# Patient Record
Sex: MALE | Race: WHITE | NOT HISPANIC OR LATINO | Employment: FULL TIME | URBAN - METROPOLITAN AREA
[De-identification: names, ages, dates, MRNs, and addresses within clinical notes are randomized per-mention and may not be internally consistent; named-entity substitution may affect disease eponyms.]

---

## 2017-02-20 ENCOUNTER — OFFICE VISIT (OUTPATIENT)
Dept: FAMILY MEDICINE | Facility: CLINIC | Age: 22
End: 2017-02-20
Payer: COMMERCIAL

## 2017-02-20 VITALS
RESPIRATION RATE: 16 BRPM | HEIGHT: 70 IN | SYSTOLIC BLOOD PRESSURE: 129 MMHG | WEIGHT: 200 LBS | DIASTOLIC BLOOD PRESSURE: 75 MMHG | TEMPERATURE: 98.5 F | HEART RATE: 56 BPM | BODY MASS INDEX: 28.63 KG/M2

## 2017-02-20 DIAGNOSIS — L02.92 FURUNCLE OF SKIN OR SUBCUTANEOUS TISSUE: Primary | ICD-10-CM

## 2017-02-20 PROCEDURE — 99213 OFFICE O/P EST LOW 20 MIN: CPT | Performed by: PHYSICIAN ASSISTANT

## 2017-02-20 NOTE — MR AVS SNAPSHOT
"              After Visit Summary   2017    Chato Victor    MRN: [id number]           Patient Information     Date Of Birth          1995        Visit Information        Provider Department      2017 11:00 AM Curry Sampson PA-C [facility]        Today's Diagnoses     Furuncle of skin or subcutaneous tissue    -  1       Follow-ups after your visit        Who to contact     If you have questions or need follow up information about today's clinic visit or your schedule please contact [facility] directly at [phone].  Normal or non-critical lab and imaging results will be communicated to you by Fashionchickhart, letter or phone within 4 business days after the clinic has received the results. If you do not hear from us within 7 days, please contact the clinic through Fashionchickhart or phone. If you have a critical or abnormal lab result, we will notify you by phone as soon as possible.  Submit refill requests through wildcraft or call your pharmacy and they will forward the refill request to us. Please allow 3 business days for your refill to be completed.          Additional Information About Your Visit        MyChart Information     wildcraft lets you send messages to your doctor, view your test results, renew your prescriptions, schedule appointments and more. To sign up, go to [email or web address] . Click on \"Log in\" on the left side of the screen, which will take you to the Welcome page. Then click on \"Sign up Now\" on the right side of the page.     You will be asked to enter the access code listed below, as well as some personal information. Please follow the directions to create your username and password.     Your access code is: [email or web address]  Expires: 3/29/2017  8:45 AM     Your access code will  in 90 days. If you need help or a new code, please call your [facility] or [phone].        Care EveryWhere ID     This is your Care EveryWhere ID. This " "could be used by other organizations to access your [place] medical records  [id number]        Your Vitals Were     Pulse Temperature Respirations Height BMI (Body Mass Index)       56 98.5  F (36.9  C) (Oral) 16 5' 10\" (1.778 m) 28.7 kg/m2        Blood Pressure from Last 3 Encounters:   02/20/17 129/75   12/29/16 128/80   09/23/16 121/81    Weight from Last 3 Encounters:   02/20/17 200 lb (90.7 kg)   12/29/16 202 lb 6.4 oz (91.8 kg)   09/23/16 192 lb (87.1 kg)              Today, you had the following     No orders found for display       Primary Care Provider Office Phone # Fax #    Curry Raul Sampson PA-C [phone]       [address] [address]        Thank you!     Thank you for choosing [facility]  for your care. Our goal is always to provide you with excellent care. Hearing back from our patients is one way we can continue to improve our services. Please take a few minutes to complete the written survey that you may receive in the mail after your visit with us. Thank you!             Your Updated Medication List - Protect others around you: Learn how to safely use, store and throw away your medicines at www.disposemymeds.org.      Notice  As of 2/20/2017 11:27 AM    You have not been prescribed any medications.      "

## 2017-02-20 NOTE — PROGRESS NOTES
"  SUBJECTIVE:                                                    Chato Victor is a 21 year old male who presents to clinic today for the following health issues:        Concern - lump     Onset: couple weeks    Description:   Lump on scrotum-left side    Intensity: mild to moderate    Progression of Symptoms: same-size will change at times-get larger and then smaller. Small currently    Accompanying Signs & Symptoms:  Pain started couple days ago. Occurs with palpation        Previous history of similar problem:   has had similar sx in the past-went away on its own    Precipitating factors:   Worsened by: worse to the touch    Alleviating factors:  Improved by:        Therapies Tried and outcome:     Patient is currently not sexually active. No concerns for STIs.     Problem list and histories reviewed & adjusted, as indicated.  Additional history: as documented    Problem list, Medication list, Allergies, and Medical/Social/Surgical histories reviewed in EPIC and updated as appropriate.    ROS:  Constitutional, cardiovascular, pulmonary, gi and gu systems are negative, except as otherwise noted.    OBJECTIVE:                                                    /75 (BP Location: Right arm, Patient Position: Chair, Cuff Size: Adult Regular)  Pulse 56  Temp 98.5  F (36.9  C) (Oral)  Resp 16  Ht 5' 10\" (1.778 m)  Wt 200 lb (90.7 kg)  BMI 28.7 kg/m2  Body mass index is 28.7 kg/(m^2).  GENERAL: healthy, alert and no distress   (male): healing furuncle noted at left base of penis. Otherwise, normal male genitalia without lesions or urethral discharge, no hernia  PSYCH: mentation appears normal, affect normal/bright    Diagnostic Test Results:  none      ASSESSMENT/PLAN:                                                      (L02.92) Furuncle of skin or subcutaneous tissue  (primary encounter diagnosis)  Comment: evident on exam. Does not appear as chancre or chancoid. Also does not appear as wart, but this is also " possible. Given change in size and occurrence, felt likely secondary to pubic grooming and irritation. Recommending warm compresses and exfoliation of area. Literature given on diagnosis. Otherwise, reassured. Follow up prn.   Plan:     Follow up: as above     Curry Sampson PA-C  [facility]

## 2017-02-20 NOTE — NURSING NOTE
"Chief Complaint   Patient presents with     Mass     Initial /75 (BP Location: Right arm, Patient Position: Chair, Cuff Size: Adult Regular)  Pulse 56  Temp 98.5  F (36.9  C) (Oral)  Resp 16  Ht 5' 10\" (1.778 m)  Wt 200 lb (90.7 kg)  BMI 28.7 kg/m2 Estimated body mass index is 28.7 kg/(m^2) as calculated from the following:    Height as of this encounter: 5' 10\" (1.778 m).    Weight as of this encounter: 200 lb (90.7 kg)..  BP completed using cuff size regular-RA    "

## 2017-02-28 ENCOUNTER — OFFICE VISIT (OUTPATIENT)
Dept: FAMILY MEDICINE | Facility: CLINIC | Age: 22
End: 2017-02-28
Payer: COMMERCIAL

## 2017-02-28 VITALS
HEART RATE: 55 BPM | BODY MASS INDEX: 29.07 KG/M2 | OXYGEN SATURATION: 97 % | SYSTOLIC BLOOD PRESSURE: 134 MMHG | DIASTOLIC BLOOD PRESSURE: 70 MMHG | TEMPERATURE: 98.3 F | WEIGHT: 202.6 LBS

## 2017-02-28 DIAGNOSIS — F33.0 MAJOR DEPRESSIVE DISORDER, RECURRENT EPISODE, MILD (H): ICD-10-CM

## 2017-02-28 DIAGNOSIS — F41.9 ANXIETY: Primary | ICD-10-CM

## 2017-02-28 PROCEDURE — 99214 OFFICE O/P EST MOD 30 MIN: CPT | Performed by: PHYSICIAN ASSISTANT

## 2017-02-28 RX ORDER — DESVENLAFAXINE 50 MG/1
50 TABLET, FILM COATED, EXTENDED RELEASE ORAL DAILY
Qty: 90 TABLET | Refills: 3 | Status: SHIPPED | OUTPATIENT
Start: 2017-02-28 | End: 2017-06-05

## 2017-02-28 ASSESSMENT — ANXIETY QUESTIONNAIRES
IF YOU CHECKED OFF ANY PROBLEMS ON THIS QUESTIONNAIRE, HOW DIFFICULT HAVE THESE PROBLEMS MADE IT FOR YOU TO DO YOUR WORK, TAKE CARE OF THINGS AT HOME, OR GET ALONG WITH OTHER PEOPLE: SOMEWHAT DIFFICULT
2. NOT BEING ABLE TO STOP OR CONTROL WORRYING: MORE THAN HALF THE DAYS
GAD7 TOTAL SCORE: 7
1. FEELING NERVOUS, ANXIOUS, OR ON EDGE: MORE THAN HALF THE DAYS
7. FEELING AFRAID AS IF SOMETHING AWFUL MIGHT HAPPEN: NOT AT ALL
5. BEING SO RESTLESS THAT IT IS HARD TO SIT STILL: SEVERAL DAYS
3. WORRYING TOO MUCH ABOUT DIFFERENT THINGS: SEVERAL DAYS
6. BECOMING EASILY ANNOYED OR IRRITABLE: NOT AT ALL

## 2017-02-28 ASSESSMENT — PATIENT HEALTH QUESTIONNAIRE - PHQ9: 5. POOR APPETITE OR OVEREATING: SEVERAL DAYS

## 2017-02-28 NOTE — MR AVS SNAPSHOT
"              After Visit Summary   2017    Chato Victor    MRN: [id number]           Patient Information     Date Of Birth          1995        Visit Information        Provider Department      2017 1:00 PM Curry Sampson PA-C [facility]        Today's Diagnoses     Anxiety    -  1    Major depressive disorder, recurrent episode, mild (H)           Follow-ups after your visit        Who to contact     If you have questions or need follow up information about today's clinic visit or your schedule please contact [facility] directly at [phone].  Normal or non-critical lab and imaging results will be communicated to you by Aldagenhart, letter or phone within 4 business days after the clinic has received the results. If you do not hear from us within 7 days, please contact the clinic through Aldagenhart or phone. If you have a critical or abnormal lab result, we will notify you by phone as soon as possible.  Submit refill requests through Back9 Network or call your pharmacy and they will forward the refill request to us. Please allow 3 business days for your refill to be completed.          Additional Information About Your Visit        MyChart Information     Back9 Network lets you send messages to your doctor, view your test results, renew your prescriptions, schedule appointments and more. To sign up, go to [email or web address] . Click on \"Log in\" on the left side of the screen, which will take you to the Welcome page. Then click on \"Sign up Now\" on the right side of the page.     You will be asked to enter the access code listed below, as well as some personal information. Please follow the directions to create your username and password.     Your access code is: [email or web address]  Expires: 3/29/2017  8:45 AM     Your access code will  in 90 days. If you need help or a new code, please call your [facility] or [phone].        Care EveryWhere ID     This is " your Care EveryWhere ID. This could be used by other organizations to access your [place] medical records  [id number]        Your Vitals Were     Pulse Temperature Pulse Oximetry BMI (Body Mass Index)          55 98.3  F (36.8  C) (Oral) 97% 29.07 kg/m2         Blood Pressure from Last 3 Encounters:   02/28/17 134/70   02/20/17 129/75   12/29/16 128/80    Weight from Last 3 Encounters:   02/28/17 202 lb 9.6 oz (91.9 kg)   02/20/17 200 lb (90.7 kg)   12/29/16 202 lb 6.4 oz (91.8 kg)              Today, you had the following     No orders found for display         Today's Medication Changes          These changes are accurate as of: 2/28/17  1:28 PM.  If you have any questions, ask your nurse or doctor.               Start taking these medicines.        Dose/Directions    desvenlafaxine succinate ER 50 MG 24 hr tablet   Commonly known as:  PRISTIQ   Used for:  Major depressive disorder, recurrent episode, mild (H), Anxiety   Started by:  Curry Sampson PA-C        Dose:  50 mg   Take 1 tablet (50 mg) by mouth daily   Quantity:  90 tablet   Refills:  3            Where to get your medicines      These medications were sent to [place] [address] [place] [facility] [id number] [place] [address]     Phone:  [phone]     desvenlafaxine succinate ER 50 MG 24 hr tablet                Primary Care Provider Office Phone # Fax #    Curry Sampson PA-C [phone]       [facility] [id number] [place] [address]        Thank you!     Thank you for choosing [facility]  for your care. Our goal is always to provide you with excellent care. Hearing back from our patients is one way we can continue to improve our services. Please take a few minutes to complete the written survey that you may receive in the mail after your visit with us. Thank you!             Your Updated Medication List - Protect others around you: Learn  how to safely use, store and throw away your medicines at www.disposemymeds.org.          This list is accurate as of: 2/28/17  1:28 PM.  Always use your most recent med list.                   Brand Name Dispense Instructions for use    desvenlafaxine succinate ER 50 MG 24 hr tablet    PRISTIQ    90 tablet    Take 1 tablet (50 mg) by mouth daily

## 2017-02-28 NOTE — PROGRESS NOTES
SUBJECTIVE:                                                    Chato Victor is a 21 year old male who presents to clinic today for the following health issues:        Depression and Anxiety Follow-Up    Status since last visit: Worsened-depression feels like this is well controlled but has been stressed out more lately. Has been studying for mcat, obtaining EMT, and adjusting between school and graduation     Other associated symptoms:None    Complicating factors:     Significant life event: Yes-  As above     Current substance abuse: None    PHQ-9 SCORE 12/29/2016   Total Score 3     INDIGO-7 SCORE 12/29/2016   Total Score 3        PHQ-9  English      PHQ-9   Any Language     GAD7     Has been on effexor resulting in nausea and headache side effects. wellbutrin has been in the past and depression continued.     Problem list and histories reviewed & adjusted, as indicated.  Additional history: as documented    Patient Active Problem List   Diagnosis     Major depressive disorder, recurrent episode, mild (H)     Anxiety     History reviewed. No pertinent past surgical history.    Social History   Substance Use Topics     Smoking status: Current Some Day Smoker     Smokeless tobacco: Never Used      Comment: socially     Alcohol use 0.0 oz/week     0 Standard drinks or equivalent per week      Comment: socially      Family History   Problem Relation Age of Onset     Coronary Artery Disease No family hx of      DIABETES No family hx of          Current Outpatient Prescriptions   Medication Sig Dispense Refill     desvenlafaxine succinate ER (PRISTIQ) 50 MG 24 hr tablet Take 1 tablet (50 mg) by mouth daily 90 tablet 3     No Known Allergies  BP Readings from Last 3 Encounters:   02/28/17 134/70   02/20/17 129/75   12/29/16 128/80    Wt Readings from Last 3 Encounters:   02/28/17 202 lb 9.6 oz (91.9 kg)   02/20/17 200 lb (90.7 kg)   12/29/16 202 lb 6.4 oz (91.8 kg)                    Reviewed and updated as needed this  visit by clinical staff  Tobacco  Allergies  Meds  Med Hx  Surg Hx  Fam Hx  Soc Hx        ROS:  Constitutional, psych, cardiovascular, pulmonary  systems are negative, except as otherwise noted.    OBJECTIVE:                                                    /70 (BP Location: Right arm, Patient Position: Chair, Cuff Size: Adult Large)  Pulse 55  Temp 98.3  F (36.8  C) (Oral)  Wt 202 lb 9.6 oz (91.9 kg)  SpO2 97%  BMI 29.07 kg/m2  Body mass index is 29.07 kg/(m^2).  GENERAL: healthy, alert and no distress  RESP: lungs clear to auscultation - no rales, rhonchi or wheezes  CV: regular rates and rhythm, normal S1 S2, no S3 or S4 and no murmur, click or rub  PSYCH: mentation appears normal, affect normal/bright    Diagnostic Test Results:  none      ASSESSMENT/PLAN:                                                      (F41.9) Anxiety  (primary encounter diagnosis)  Comment: evident on history. Discussed with patient both anxiety and depression are common to occur together. pristiq will likely aid in symptoms as well. Is seeing therapy once weekly. Started this last week. Recommending continued cbt and follow up in 3 months. Sooner if concerns.   Plan: desvenlafaxine succinate ER (PRISTIQ) 50 MG 24         hr tablet        -Medication use and side effects discussed with the patient. Patient is in complete understanding and agreement with plan.       (F33.0) Major depressive disorder, recurrent episode, mild (H)  Comment:   Plan: desvenlafaxine succinate ER (PRISTIQ) 50 MG 24         hr tablet              Follow up: 3 months     Curry Sampson PA-C  [facility]

## 2017-02-28 NOTE — NURSING NOTE
"Chief Complaint   Patient presents with     Medication Follow-up       Initial /70 (BP Location: Right arm, Patient Position: Chair, Cuff Size: Adult Large)  Pulse 55  Temp 98.3  F (36.8  C) (Oral)  Wt 202 lb 9.6 oz (91.9 kg)  SpO2 97%  BMI 29.07 kg/m2 Estimated body mass index is 29.07 kg/(m^2) as calculated from the following:    Height as of 2/20/17: 5' 10\" (1.778 m).    Weight as of this encounter: 202 lb 9.6 oz (91.9 kg).  BP completed using cuff size: melody Condon MA    "

## 2017-03-01 ASSESSMENT — PATIENT HEALTH QUESTIONNAIRE - PHQ9: SUM OF ALL RESPONSES TO PHQ QUESTIONS 1-9: 4

## 2017-03-01 ASSESSMENT — ANXIETY QUESTIONNAIRES: GAD7 TOTAL SCORE: 7

## 2017-06-02 ENCOUNTER — TELEPHONE (OUTPATIENT)
Dept: FAMILY MEDICINE | Facility: CLINIC | Age: 22
End: 2017-06-02

## 2017-06-02 DIAGNOSIS — F41.9 ANXIETY: ICD-10-CM

## 2017-06-02 DIAGNOSIS — F33.0 MAJOR DEPRESSIVE DISORDER, RECURRENT EPISODE, MILD (H): ICD-10-CM

## 2017-06-02 NOTE — TELEPHONE ENCOUNTER
Fax from [facility]   PA needed Wendi Avendaño we will need to call [facility] for insurance info not on fax.   Before we do, what is rationale for PA?   Garcia Valencia RN

## 2017-06-05 RX ORDER — DESVENLAFAXINE 50 MG/1
50 TABLET, FILM COATED, EXTENDED RELEASE ORAL DAILY
Qty: 90 TABLET | Refills: 2 | Status: SHIPPED | OUTPATIENT
Start: 2017-06-05 | End: 2018-05-07

## 2017-06-05 NOTE — TELEPHONE ENCOUNTER
We called [facility]  [facility] [phone]  ID [id number]  Pt new to this pan per rep 1/1/17. We talked with [facility] rep: Heidi PALOMARES approved 36 mos  Effective date: 6/5/17 - 6/5/2020  PA reference # [id number]  Left message [facility] voicemail  Garcia Valencia RN

## 2017-06-05 NOTE — TELEPHONE ENCOUNTER
Pt calls, discussed below, [facility] wanting to fill Brand name and more expensive, called our pharmacy, product now available in past 30 days, resent for generic, recommend pt f/u with [facility], call or have pharmacy fax info if further troubles    Lizbeth Byrne RN, BSN  Message handled by Nurse Triage.

## 2017-06-23 ENCOUNTER — OFFICE VISIT (OUTPATIENT)
Dept: FAMILY MEDICINE | Facility: CLINIC | Age: 22
End: 2017-06-23
Payer: COMMERCIAL

## 2017-06-23 VITALS
BODY MASS INDEX: 28.42 KG/M2 | TEMPERATURE: 97.7 F | DIASTOLIC BLOOD PRESSURE: 80 MMHG | HEART RATE: 61 BPM | HEIGHT: 71 IN | RESPIRATION RATE: 16 BRPM | SYSTOLIC BLOOD PRESSURE: 128 MMHG | WEIGHT: 203 LBS

## 2017-06-23 DIAGNOSIS — N50.89 PAIN OF MALE GENITALIA: ICD-10-CM

## 2017-06-23 DIAGNOSIS — R30.0 DYSURIA: Primary | ICD-10-CM

## 2017-06-23 LAB
ALBUMIN UR-MCNC: NEGATIVE MG/DL
APPEARANCE UR: CLEAR
BILIRUB UR QL STRIP: NEGATIVE
COLOR UR AUTO: YELLOW
GLUCOSE UR STRIP-MCNC: NEGATIVE MG/DL
HGB UR QL STRIP: NEGATIVE
KETONES UR STRIP-MCNC: NEGATIVE MG/DL
LEUKOCYTE ESTERASE UR QL STRIP: NEGATIVE
NITRATE UR QL: NEGATIVE
PH UR STRIP: 7 PH (ref 5–7)
SP GR UR STRIP: 1.01 (ref 1–1.03)
URN SPEC COLLECT METH UR: NORMAL
UROBILINOGEN UR STRIP-ACNC: 0.2 EU/DL (ref 0.2–1)

## 2017-06-23 PROCEDURE — 87591 N.GONORRHOEAE DNA AMP PROB: CPT | Performed by: FAMILY MEDICINE

## 2017-06-23 PROCEDURE — 81003 URINALYSIS AUTO W/O SCOPE: CPT | Performed by: FAMILY MEDICINE

## 2017-06-23 PROCEDURE — 87491 CHLMYD TRACH DNA AMP PROBE: CPT | Performed by: FAMILY MEDICINE

## 2017-06-23 PROCEDURE — 96372 THER/PROPH/DIAG INJ SC/IM: CPT | Performed by: FAMILY MEDICINE

## 2017-06-23 PROCEDURE — 99213 OFFICE O/P EST LOW 20 MIN: CPT | Mod: 25 | Performed by: FAMILY MEDICINE

## 2017-06-23 RX ORDER — EPINEPHRINE 0.3 MG/.3ML
INJECTION SUBCUTANEOUS
Refills: 11 | COMMUNITY
Start: 2017-06-12

## 2017-06-23 RX ORDER — DOXYCYCLINE 100 MG/1
100 CAPSULE ORAL 2 TIMES DAILY
Qty: 14 CAPSULE | Refills: 0 | Status: SHIPPED | OUTPATIENT
Start: 2017-06-23 | End: 2017-06-30

## 2017-06-23 RX ORDER — CEFTRIAXONE SODIUM 250 MG/1
250 INJECTION, POWDER, FOR SOLUTION INTRAMUSCULAR; INTRAVENOUS ONCE
Qty: 1 ML | Refills: 0 | OUTPATIENT
Start: 2017-06-23 | End: 2017-06-23

## 2017-06-23 RX ORDER — ALBUTEROL SULFATE 90 UG/1
2 AEROSOL, METERED RESPIRATORY (INHALATION) EVERY 6 HOURS PRN
Qty: 1 INHALER | Refills: 1 | COMMUNITY
Start: 2017-06-23 | End: 2021-12-21

## 2017-06-23 NOTE — NURSING NOTE
"Chief Complaint   Patient presents with     Groin Pain     groin pain x1 1/2 weeks       Initial /80 (BP Location: Right arm, Patient Position: Chair, Cuff Size: Adult Large)  Pulse 61  Temp 97.7  F (36.5  C) (Oral)  Resp 16  Ht 5' 10.5\" (1.791 m)  Wt 203 lb (92.1 kg)  BMI 28.72 kg/m2 Estimated body mass index is 28.72 kg/(m^2) as calculated from the following:    Height as of this encounter: 5' 10.5\" (1.791 m).    Weight as of this encounter: 203 lb (92.1 kg).  Medication Reconciliation: complete     Trini Colon/ROGELIO  [place]      "

## 2017-06-23 NOTE — PROGRESS NOTES
SUBJECTIVE:                                                    Chato Victor is a 21 year old male who presents to clinic today for the following health issues:      Genitourinary - Male  Onset: x 1 1/2 weeks    Description:   Dysuria (painful urination): YES  Hematuria (blood in urine): no   Frequency: no   Are you urinating at night : no   Hesitancy (delay in urine): no   Retention (unable to empty): YES  Decrease in urinary flow: no   Incontinence: no     Progression of Symptoms:  worsening    Accompanying Signs & Symptoms:  Fever: no   Back/Flank pain: no   Urethral discharge: no   Testicle lumps/masses/pain: no   Nausea and/or vomiting: no   Abdominal pain: no     History:   History of frequent UTI's: no   History of kidney stones: no   History of hernias: no   Personal or Family history of Prostate problems: no  Sexually active: YES    Precipitating factors:       Alleviating factors:    Does masturbate at least 2-3 times a week. Is sexually active and did have unprotected sex recently.       Problem list and histories reviewed & adjusted, as indicated.  Additional history: as documented    Patient Active Problem List   Diagnosis     Major depressive disorder, recurrent episode, mild (H)     Anxiety     No past surgical history on file.    Social History   Substance Use Topics     Smoking status: Current Some Day Smoker     Smokeless tobacco: Never Used      Comment: socially     Alcohol use 0.0 oz/week     0 Standard drinks or equivalent per week      Comment: socially      Family History   Problem Relation Age of Onset     Coronary Artery Disease No family hx of      DIABETES No family hx of            Reviewed and updated as needed this visit by clinical staff  Tobacco  Allergies       Reviewed and updated as needed this visit by Provider         ROS:  Constitutional,  gu systems are negative, except as otherwise noted.    OBJECTIVE:     /80 (BP Location: Right arm, Patient Position: Chair, Cuff  "Size: Adult Large)  Pulse 61  Temp 97.7  F (36.5  C) (Oral)  Resp 16  Ht 5' 10.5\" (1.791 m)  Wt 203 lb (92.1 kg)  BMI 28.72 kg/m2  Body mass index is 28.72 kg/(m^2).  GENERAL: healthy, alert and no distress  RESP: lungs clear to auscultation - no rales, rhonchi or wheezes  CV: regular rate and rhythm, normal S1 S2, no S3 or S4, no murmur, click or rub, no peripheral edema and peripheral pulses strong   (male): testicles normal without atrophy or masses, scant clear penile discharge noted otherwise unremarkable     Diagnostic Test Results:  UA RESULTS:  Recent Labs   Lab Test  06/23/17   1507   COLOR  Yellow   APPEARANCE  Clear   URINEGLC  Negative   URINEBILI  Negative   URINEKETONE  Negative   SG  1.010   UBLD  Negative   URINEPH  7.0   PROTEIN  Negative   UROBILINOGEN  0.2   NITRITE  Negative   LEUKEST  Negative         ASSESSMENT/PLAN:       1. Dysuria  - will treat presumptively for epididymitis secondary to chlamydia/gonorrhea.   - UA reflex to Microscopic and Culture  - Chlamydia trachomatis PCR  - Neisseria gonorrhoeae PCR  - doxycycline (VIBRAMYCIN) 100 MG capsule; Take 1 capsule (100 mg) by mouth 2 times daily for 7 days  Dispense: 14 capsule; Refill: 0  - cefTRIAXone (ROCEPHIN) 250 MG injection; Inject 250 mg into the muscle once for 1 dose  Dispense: 1 mL; Refill: 0  - CEFTRIAXONE NA INJ /250MG  - INJECTION INTRAMUSCULAR OR SUB-Q    2. Pain of male genitalia  - as above  - doxycycline (VIBRAMYCIN) 100 MG capsule; Take 1 capsule (100 mg) by mouth 2 times daily for 7 days  Dispense: 14 capsule; Refill: 0  - cefTRIAXone (ROCEPHIN) 250 MG injection; Inject 250 mg into the muscle once for 1 dose  Dispense: 1 mL; Refill: 0  - CEFTRIAXONE NA INJ /250MG  - INJECTION INTRAMUSCULAR OR SUB-Q    See Patient Instructions    Emilie Guevara MD  [facility]    "

## 2017-06-23 NOTE — MR AVS SNAPSHOT
"              After Visit Summary   2017    Chato Victor    MRN: [id number]           Patient Information     Date Of Birth          1995        Visit Information        Provider Department      2017 2:45 PM Emilie Guevara MD [facility]        Today's Diagnoses     Dysuria    -  1    Pain of male genitalia          Care Instructions    Recommend safe sex practices always  Follow up if pain persists or worsens            Follow-ups after your visit        Who to contact     If you have questions or need follow up information about today's clinic visit or your schedule please contact [facility] directly at [phone].  Normal or non-critical lab and imaging results will be communicated to you by MyChart, letter or phone within 4 business days after the clinic has received the results. If you do not hear from us within 7 days, please contact the clinic through MyChart or phone. If you have a critical or abnormal lab result, we will notify you by phone as soon as possible.  Submit refill requests through [employer] or call your pharmacy and they will forward the refill request to us. Please allow 3 business days for your refill to be completed.          Additional Information About Your Visit        MyChart Information     [employer] lets you send messages to your doctor, view your test results, renew your prescriptions, schedule appointments and more. To sign up, go to [email or web address] . Click on \"Log in\" on the left side of the screen, which will take you to the Welcome page. Then click on \"Sign up Now\" on the right side of the page.     You will be asked to enter the access code listed below, as well as some personal information. Please follow the directions to create your username and password.     Your access code is: [email or web address]  Expires: 2017  3:32 PM     Your access code will  in 90 days. If you need help or a new code, please " "call your [place] clinic or [phone].        Care EveryWhere ID     This is your Care EveryWhere ID. This could be used by other organizations to access your [place] medical records  [id number]        Your Vitals Were     Pulse Temperature Respirations Height BMI (Body Mass Index)       61 97.7  F (36.5  C) (Oral) 16 5' 10.5\" (1.791 m) 28.72 kg/m2        Blood Pressure from Last 3 Encounters:   06/23/17 128/80   02/28/17 134/70   02/20/17 129/75    Weight from Last 3 Encounters:   06/23/17 203 lb (92.1 kg)   02/28/17 202 lb 9.6 oz (91.9 kg)   02/20/17 200 lb (90.7 kg)              We Performed the Following     Chlamydia trachomatis PCR     Neisseria gonorrhoeae PCR     UA reflex to Microscopic and Culture          Today's Medication Changes          These changes are accurate as of: 6/23/17  3:32 PM.  If you have any questions, ask your nurse or doctor.               Start taking these medicines.        Dose/Directions    doxycycline 100 MG capsule   Commonly known as:  VIBRAMYCIN   Used for:  Dysuria, Pain of male genitalia   Started by:  Emilie Guevara MD        Dose:  100 mg   Take 1 capsule (100 mg) by mouth 2 times daily for 7 days   Quantity:  14 capsule   Refills:  0            Where to get your medicines      These medications were sent to [facility] [address] [place] [address] AT [address] [address]    Hours:  24-hours Phone:  [phone]     doxycycline 100 MG capsule                Primary Care Provider Office Phone # Fax #    Curry Raul Sampson PA-C [phone]       [address] [address]        Equal Access to Services     KEVIN SANCHEZ AH: Abdoul perry Sokayy, waaxda luqadaha, qaybta kaalmada adeegyafrancisco, abhay gonzales. So [facility] [phone].    ATENCIÓN: Si habla español, tiene a hood disposición servicios " tati de asistencia lingüística. Jhoan jiménez [phone].    We comply with applicable federal civil rights laws and [place] laws. We do not discriminate on the basis of race, color, national origin, age, disability sex, sexual orientation or gender identity.            Thank you!     Thank you for choosing [facility]  for your care. Our goal is always to provide you with excellent care. Hearing back from our patients is one way we can continue to improve our services. Please take a few minutes to complete the written survey that you may receive in the mail after your visit with us. Thank you!             Your Updated Medication List - Protect others around you: Learn how to safely use, store and throw away your medicines at www.disposemymeds.org.          This list is accurate as of: 6/23/17  3:32 PM.  Always use your most recent med list.                   Brand Name Dispense Instructions for use Diagnosis    albuterol 108 (90 BASE) MCG/ACT Inhaler    PROAIR HFA/PROVENTIL HFA/VENTOLIN HFA    1 Inhaler    Inhale 2 puffs into the lungs every 6 hours as needed for shortness of breath / dyspnea or wheezing        desvenlafaxine succinate 50 MG 24 hr tablet    PRISTIQ    90 tablet    Take 1 tablet (50 mg) by mouth daily    Major depressive disorder, recurrent episode, mild (H), Anxiety       doxycycline 100 MG capsule    VIBRAMYCIN    14 capsule    Take 1 capsule (100 mg) by mouth 2 times daily for 7 days    Dysuria, Pain of male genitalia       EPINEPHrine 0.3 MG/0.3ML injection      INJECT 0.3 ML INTRAMUSCULARLY AS NEEDED. MAY REPEAT

## 2017-06-25 LAB
C TRACH DNA SPEC QL NAA+PROBE: ABNORMAL
N GONORRHOEA DNA SPEC QL NAA+PROBE: NORMAL
SPECIMEN SOURCE: ABNORMAL
SPECIMEN SOURCE: NORMAL

## 2017-06-26 ENCOUNTER — TELEPHONE (OUTPATIENT)
Dept: FAMILY MEDICINE | Facility: CLINIC | Age: 22
End: 2017-06-26

## 2017-06-26 DIAGNOSIS — N50.89 PAIN OF MALE GENITALIA: ICD-10-CM

## 2017-06-26 DIAGNOSIS — R30.0 DYSURIA: ICD-10-CM

## 2017-06-26 RX ORDER — DOXYCYCLINE 100 MG/1
100 CAPSULE ORAL 2 TIMES DAILY
Qty: 6 CAPSULE | Refills: 0 | Status: CANCELLED | OUTPATIENT
Start: 2017-06-26

## 2017-06-26 NOTE — TELEPHONE ENCOUNTER
Patient called back.  Advised of + chlamydia.  Advised 1 month recheck to retest.  Advised no sex til 7 days after last person treated so do not spread back and forth.  Patient agrees with plan.  Natalie Alcala RN

## 2017-06-26 NOTE — TELEPHONE ENCOUNTER
+ chlamydia.  Had Rocephin 250 mg IM at visit and was given RX for Doxycycline 100 mg bid x 7 days.  MD form completed and faxed and logged in book at [facility].  L/M to call.  Natalie Alcala RN

## 2017-07-06 ENCOUNTER — OFFICE VISIT (OUTPATIENT)
Dept: FAMILY MEDICINE | Facility: CLINIC | Age: 22
End: 2017-07-06
Payer: COMMERCIAL

## 2017-07-06 VITALS
WEIGHT: 200 LBS | BODY MASS INDEX: 28.29 KG/M2 | SYSTOLIC BLOOD PRESSURE: 127 MMHG | HEART RATE: 75 BPM | DIASTOLIC BLOOD PRESSURE: 73 MMHG | TEMPERATURE: 99.2 F

## 2017-07-06 DIAGNOSIS — R07.0 THROAT PAIN: Primary | ICD-10-CM

## 2017-07-06 LAB
DEPRECATED S PYO AG THROAT QL EIA: NORMAL
MICRO REPORT STATUS: NORMAL
SPECIMEN SOURCE: NORMAL

## 2017-07-06 PROCEDURE — 87880 STREP A ASSAY W/OPTIC: CPT | Performed by: PHYSICIAN ASSISTANT

## 2017-07-06 PROCEDURE — 87081 CULTURE SCREEN ONLY: CPT | Performed by: PHYSICIAN ASSISTANT

## 2017-07-06 PROCEDURE — 99213 OFFICE O/P EST LOW 20 MIN: CPT | Performed by: PHYSICIAN ASSISTANT

## 2017-07-06 NOTE — NURSING NOTE
"  Chief Complaint   Patient presents with     Pharyngitis       Initial /73 (BP Location: Right arm, Patient Position: Chair, Cuff Size: Adult Large)  Pulse 75  Temp 99.2  F (37.3  C) (Oral)  Wt 200 lb (90.7 kg)  BMI 28.29 kg/m2 Estimated body mass index is 28.29 kg/(m^2) as calculated from the following:    Height as of 6/23/17: 5' 10.5\" (1.791 m).    Weight as of this encounter: 200 lb (90.7 kg).  Medication Reconciliation: complete      COLLEEN Esquivel    "

## 2017-07-06 NOTE — PATIENT INSTRUCTIONS
Viral Pharyngitis (Sore Throat)    You (or your child, if your child is the patient) have pharyngitis (sore throat). This infection is caused by a virus. It can cause throat pain that is worse when swallowing, aching all over, headache, and fever. The infection may be spread by coughing, kissing, or touching others after touching your mouth or nose. Antibiotic medications do not work against viruses, so they are not used for treating this condition.  Home care    If your symptoms are severe, rest at home. Return to work or school when you feel well enough.     Drink plenty of fluids to avoid dehydration.    For children: Use acetaminophen for fever, fussiness or discomfort. In infants over six months of age, you may use ibuprofen instead of acetaminophen. (NOTE: If your child has chronic liver or kidney disease or ever had a stomach ulcer or GI bleeding, talk with your doctor before using these medicines.) (NOTE: Aspirin should never be used in anyone under 18 years of age who is ill with a fever. It may cause severe liver damage.)     For adults: You may use acetaminophen or ibuprofen to control pain or fever, unless another medicine was prescribed for this. (NOTE: If you have chronic liver or kidney disease or ever had a stomach ulcer or GI bleeding, talk with your doctor before using these medicines.)    Throat lozenges or numbing throat sprays can help reduce pain. Gargling with warm salt water will also help reduce throat pain. For this, dissolve 1/2 teaspoon of salt in 1 glass of warm water. To help soothe a sore throat, children can sip on juice or a popsicle. Children 5 years and older can also suck on a lollipop or hard candy.    Avoid salty or spicy foods, which can be irritating to the throat.  Follow-up care  Follow up with your healthcare provider or our staff if you are not improving over the next week.  When to seek medical advice  Call your healthcare provider right away if any of these  occur:    Fever as directed by your doctor.  For children, seek care if:    Your child is of any age and has repeated fevers above 104 F (40 C).    Your child is younger than 2 years of age and has a fever of 100.4 F (38 C) that continues for more than 1 day.    Your child is 2 years old or older and has a fever of 100.4 F (38 C) that continues for more than 3 days.    New or worsening ear pain, sinus pain, or headache    Painful lumps in the back of neck    Stiff neck    Lymph nodes are getting larger    Inability to swallow liquids, excessive drooling, or inability to open mouth wide due to throat pain    Signs of dehydration (very dark urine or no urine, sunken eyes, dizziness)    Trouble breathing or noisy breathing    Muffled voice    New rash    Child appears to be getting sicker  Date Last Reviewed: 4/13/2015 2000-2017 [employer]. [address] [place] [address], [place] [address]. All rights reserved. This information is not intended as a substitute for professional medical care. Always follow your healthcare professional's instructions.

## 2017-07-06 NOTE — MR AVS SNAPSHOT
After Visit Summary   7/6/2017    Chato Victor    MRN: [id number]           Patient Information     Date Of Birth          1995        Visit Information        Provider Department      7/6/2017 2:45 PM Curry Sampson PA-C [facility]        Today's Diagnoses     Throat pain    -  1      Care Instructions      Viral Pharyngitis (Sore Throat)    You (or your child, if your child is the patient) have pharyngitis (sore throat). This infection is caused by a virus. It can cause throat pain that is worse when swallowing, aching all over, headache, and fever. The infection may be spread by coughing, kissing, or touching others after touching your mouth or nose. Antibiotic medications do not work against viruses, so they are not used for treating this condition.  Home care    If your symptoms are severe, rest at home. Return to work or school when you feel well enough.     Drink plenty of fluids to avoid dehydration.    For children: Use acetaminophen for fever, fussiness or discomfort. In infants over six months of age, you may use ibuprofen instead of acetaminophen. (NOTE: If your child has chronic liver or kidney disease or ever had a stomach ulcer or GI bleeding, talk with your doctor before using these medicines.) (NOTE: Aspirin should never be used in anyone under 18 years of age who is ill with a fever. It may cause severe liver damage.)     For adults: You may use acetaminophen or ibuprofen to control pain or fever, unless another medicine was prescribed for this. (NOTE: If you have chronic liver or kidney disease or ever had a stomach ulcer or GI bleeding, talk with your doctor before using these medicines.)    Throat lozenges or numbing throat sprays can help reduce pain. Gargling with warm salt water will also help reduce throat pain. For this, dissolve 1/2 teaspoon of salt in 1 glass of warm water. To help soothe a sore throat, children can sip on juice or a  popsicle. Children 5 years and older can also suck on a lollipop or hard candy.    Avoid salty or spicy foods, which can be irritating to the throat.  Follow-up care  Follow up with your healthcare provider or our staff if you are not improving over the next week.  When to seek medical advice  Call your healthcare provider right away if any of these occur:    Fever as directed by your doctor.  For children, seek care if:    Your child is of any age and has repeated fevers above 104 F (40 C).    Your child is younger than 2 years of age and has a fever of 100.4 F (38 C) that continues for more than 1 day.    Your child is 2 years old or older and has a fever of 100.4 F (38 C) that continues for more than 3 days.    New or worsening ear pain, sinus pain, or headache    Painful lumps in the back of neck    Stiff neck    Lymph nodes are getting larger    Inability to swallow liquids, excessive drooling, or inability to open mouth wide due to throat pain    Signs of dehydration (very dark urine or no urine, sunken eyes, dizziness)    Trouble breathing or noisy breathing    Muffled voice    New rash    Child appears to be getting sicker  Date Last Reviewed: 4/13/2015 2000-2017 [employer]. [address] [place] [address], [place] [address]. All rights reserved. This information is not intended as a substitute for professional medical care. Always follow your healthcare professional's instructions.                Follow-ups after your visit        Who to contact     If you have questions or need follow up information about today's clinic visit or your schedule please contact [facility] directly at [phone].  Normal or non-critical lab and imaging results will be communicated to you by MyChart, letter or phone within 4 business days after the clinic has received the results. If you do not hear from us within 7 days, please contact the clinic through MyChart or phone. If you have a critical or  "abnormal lab result, we will notify you by phone as soon as possible.  Submit refill requests through [employer] or call your pharmacy and they will forward the refill request to us. Please allow 3 business days for your refill to be completed.          Additional Information About Your Visit        Bub[employer] Information     [employer] lets you send messages to your doctor, view your test results, renew your prescriptions, schedule appointments and more. To sign up, go to [email or web address] . Click on \"Log in\" on the left side of the screen, which will take you to the Welcome page. Then click on \"Sign up Now\" on the right side of the page.     You will be asked to enter the access code listed below, as well as some personal information. Please follow the directions to create your username and password.     Your access code is: [email or web address]  Expires: 2017  3:32 PM     Your access code will  in 90 days. If you need help or a new code, please call your [place] clinic or [phone].        Care EveryWhere ID     This is your Care EveryWhere ID. This could be used by other organizations to access your [place] medical records  [id number]        Your Vitals Were     Pulse Temperature BMI (Body Mass Index)             75 99.2  F (37.3  C) (Oral) 28.29 kg/m2          Blood Pressure from Last 3 Encounters:   17 127/73   17 128/80   17 134/70    Weight from Last 3 Encounters:   17 200 lb (90.7 kg)   17 203 lb (92.1 kg)   17 202 lb 9.6 oz (91.9 kg)              We Performed the Following     Beta strep group A culture     Rapid strep screen        Primary Care Provider Office Phone # Fax #    Curry Raul Sampson PA-C [phone]       [address] [address]        Equal Access to Services     KEVIN SANCHEZ AH: Abdoul Perez, waaxda luqdov, qaybta kaalzenaida cerrato, abhay zhu " lalary gonzales. So [facility] [phone].    ATENCIÓN: Si santos corona, tiene a hood disposición servicios gratuitos de asistencia lingüística. Jhoan jiménez [phone].    We comply with applicable federal civil rights laws and [place] laws. We do not discriminate on the basis of race, color, national origin, age, disability sex, sexual orientation or gender identity.            Thank you!     Thank you for choosing [facility]  for your care. Our goal is always to provide you with excellent care. Hearing back from our patients is one way we can continue to improve our services. Please take a few minutes to complete the written survey that you may receive in the mail after your visit with us. Thank you!             Your Updated Medication List - Protect others around you: Learn how to safely use, store and throw away your medicines at www.disposemymeds.org.          This list is accurate as of: 7/6/17  3:22 PM.  Always use your most recent med list.                   Brand Name Dispense Instructions for use Diagnosis    albuterol 108 (90 BASE) MCG/ACT Inhaler    PROAIR HFA/PROVENTIL HFA/VENTOLIN HFA    1 Inhaler    Inhale 2 puffs into the lungs every 6 hours as needed for shortness of breath / dyspnea or wheezing        desvenlafaxine succinate 50 MG 24 hr tablet    PRISTIQ    90 tablet    Take 1 tablet (50 mg) by mouth daily    Major depressive disorder, recurrent episode, mild (H), Anxiety       EPINEPHrine 0.3 MG/0.3ML injection      INJECT 0.3 ML INTRAMUSCULARLY AS NEEDED. MAY REPEAT

## 2017-07-06 NOTE — PROGRESS NOTES
SUBJECTIVE:                                                    Chato Victor is a 21 year old male who presents to clinic today for the following health issues:      Acute Illness   Acute illness concerns: sore throat, HA  Onset: x 3 days    Fever: felt like it-did not take    Chills/Sweats: YES    Headache (location?): YES    Sinus Pressure:no    Conjunctivitis:  no    Ear Pain: no    Rhinorrhea: no     Congestion: no     Sore Throat: YES     Cough: YES-mild at times    Wheeze: no     Decreased Appetite: YES    Nausea: no     Vomiting: no     Diarrhea:  no     Dysuria/Freq.: no     Fatigue/Achiness: YES    Sick/Strep Exposure: no      Therapies Tried and outcome: dayquil, increased fluids      Problem list and histories reviewed & adjusted, as indicated.  Additional history: as documented    Patient Active Problem List   Diagnosis     Major depressive disorder, recurrent episode, mild (H)     Anxiety     History reviewed. No pertinent surgical history.    Social History   Substance Use Topics     Smoking status: Current Some Day Smoker     Smokeless tobacco: Never Used      Comment: socially     Alcohol use 0.0 oz/week     0 Standard drinks or equivalent per week      Comment: socially      Family History   Problem Relation Age of Onset     Coronary Artery Disease No family hx of      DIABETES No family hx of          Current Outpatient Prescriptions   Medication Sig Dispense Refill     EPINEPHrine 0.3 MG/0.3ML injection INJECT 0.3 ML INTRAMUSCULARLY AS NEEDED. MAY REPEAT  11     albuterol (PROAIR HFA/PROVENTIL HFA/VENTOLIN HFA) 108 (90 BASE) MCG/ACT Inhaler Inhale 2 puffs into the lungs every 6 hours as needed for shortness of breath / dyspnea or wheezing 1 Inhaler 1     desvenlafaxine succinate (PRISTIQ) 50 MG 24 hr tablet Take 1 tablet (50 mg) by mouth daily 90 tablet 2     No Known Allergies  BP Readings from Last 3 Encounters:   07/06/17 127/73   06/23/17 128/80   02/28/17 134/70    Wt Readings from Last 3  Encounters:   07/06/17 200 lb (90.7 kg)   06/23/17 203 lb (92.1 kg)   02/28/17 202 lb 9.6 oz (91.9 kg)                    Reviewed and updated as needed this visit by clinical staff  Tobacco  Allergies  Meds  Problems  Med Hx  Surg Hx  Fam Hx  Soc Hx        Reviewed and updated as needed this visit by Provider  Allergies  Meds  Problems         ROS:  Constitutional, HEENT, cardiovascular, pulmonary, gi and gu systems are negative, except as otherwise noted.    OBJECTIVE:     /73 (BP Location: Right arm, Patient Position: Chair, Cuff Size: Adult Large)  Pulse 75  Temp 99.2  F (37.3  C) (Oral)  Wt 200 lb (90.7 kg)  BMI 28.29 kg/m2  Body mass index is 28.29 kg/(m^2).  GENERAL: healthy, alert and no distress  EYES: Eyes grossly normal to inspection, PERRL and conjunctivae and sclerae normal  HENT: normal cephalic/atraumatic, both ears: clear effusion, nasal mucosa edematous , oral mucous membranes moist, tonsillar hypertrophy and tonsillar exudate on left  NECK: no adenopathy, no asymmetry, masses, or scars and thyroid normal to palpation  RESP: lungs clear to auscultation - no rales, rhonchi or wheezes  CV: regular rates and rhythm, normal S1 S2, no S3 or S4 and no murmur, click or rub  SKIN: no suspicious lesions or rashes  PSYCH: mentation appears normal, affect normal/bright    Diagnostic Test Results:  Results for orders placed or performed in visit on 07/06/17 (from the past 24 hour(s))   Rapid strep screen   Result Value Ref Range    Specimen Description Throat     Rapid Strep A Screen       NEGATIVE: No Group A streptococcal antigen detected by immunoassay, await   culture report.      Micro Report Status FINAL 07/06/2017        ASSESSMENT/PLAN:     (R07.0) Throat pain  (primary encounter diagnosis)  Comment: rapid strep negative. Based on course length and normal exam despite exudate, felt likely viral. Recommending supportive care measures. If failure to improvement in ~5 days, patient  should RTC. Sooner if worsening. Of note, mono is possible. Will consider future testing if remaining symptomatic   Plan: Rapid strep screen, Beta strep group A culture              Follow up: as above     Curry Sampson PA-C  [facility]

## 2017-07-07 LAB
BACTERIA SPEC CULT: NORMAL
MICRO REPORT STATUS: NORMAL
SPECIMEN SOURCE: NORMAL

## 2017-07-11 ENCOUNTER — OFFICE VISIT (OUTPATIENT)
Dept: FAMILY MEDICINE | Facility: CLINIC | Age: 22
End: 2017-07-11
Payer: COMMERCIAL

## 2017-07-11 VITALS
WEIGHT: 196 LBS | HEART RATE: 78 BPM | TEMPERATURE: 98.1 F | RESPIRATION RATE: 14 BRPM | BODY MASS INDEX: 27.73 KG/M2 | DIASTOLIC BLOOD PRESSURE: 84 MMHG | SYSTOLIC BLOOD PRESSURE: 135 MMHG

## 2017-07-11 DIAGNOSIS — R07.0 THROAT PAIN: Primary | ICD-10-CM

## 2017-07-11 DIAGNOSIS — R13.10 DYSPHAGIA, UNSPECIFIED TYPE: ICD-10-CM

## 2017-07-11 PROCEDURE — 99213 OFFICE O/P EST LOW 20 MIN: CPT | Performed by: PHYSICIAN ASSISTANT

## 2017-07-11 RX ORDER — DIPHENHYDRAMINE HYDROCHLORIDE AND LIDOCAINE HYDROCHLORIDE AND ALUMINUM HYDROXIDE AND MAGNESIUM HYDRO
5-10 KIT EVERY 6 HOURS PRN
Qty: 237 ML | Refills: 1 | Status: SHIPPED | OUTPATIENT
Start: 2017-07-11 | End: 2018-05-09

## 2017-07-11 NOTE — NURSING NOTE
"  Chief Complaint   Patient presents with     Pharyngitis       Initial /84 (BP Location: Right arm, Patient Position: Chair, Cuff Size: Adult Large)  Pulse 78  Temp 98.1  F (36.7  C) (Oral)  Resp 14  Wt 196 lb (88.9 kg)  BMI 27.73 kg/m2 Estimated body mass index is 27.73 kg/(m^2) as calculated from the following:    Height as of 6/23/17: 5' 10.5\" (1.791 m).    Weight as of this encounter: 196 lb (88.9 kg).  Medication Reconciliation: complete      COLLEEN Esquivel    "

## 2017-07-11 NOTE — PROGRESS NOTES
SUBJECTIVE:                                                    Chato Victor is a 21 year old male who presents to clinic today for the following health issues:      Acute Illness   Acute illness concerns: sore throat  Onset: x 1.5 weeks-worse this past weekend. Improved today.     Fever: did not take    Chills/Sweats: YES    Headache (location?): YES    Sinus Pressure:no    Conjunctivitis:  no    Ear Pain: no    Rhinorrhea: no    Congestion: no     Sore Throat: YES- with multiple white spots in back of throat-sore down all of throat-sharp pain after eating or drinking. Has had tonsilliths in the past and after water picking, these disappeared.      Cough: YES    Wheeze: no     Decreased Appetite: YES    Nausea: YES    Vomiting: no     Diarrhea:  no     Dysuria/Freq.: YES    Fatigue/Achiness: YES    Sick/Strep Exposure: no     Therapies Tried and outcome: tylenol, dayquil/nyquil        Problem list and histories reviewed & adjusted, as indicated.  Additional history: as documented    Patient Active Problem List   Diagnosis     Major depressive disorder, recurrent episode, mild (H)     Anxiety     History reviewed. No pertinent surgical history.    Social History   Substance Use Topics     Smoking status: Current Some Day Smoker     Smokeless tobacco: Never Used      Comment: socially     Alcohol use 0.0 oz/week     0 Standard drinks or equivalent per week      Comment: socially      Family History   Problem Relation Age of Onset     Coronary Artery Disease No family hx of      DIABETES No family hx of          Current Outpatient Prescriptions   Medication Sig Dispense Refill     magic mouthwash suspension (diphenhydramine, lidocaine, aluminum-magnesium & simethicone) Swish and swallow 5-10 mLs in mouth every 6 hours as needed for mouth sores 237 mL 1     EPINEPHrine 0.3 MG/0.3ML injection INJECT 0.3 ML INTRAMUSCULARLY AS NEEDED. MAY REPEAT  11     albuterol (PROAIR HFA/PROVENTIL HFA/VENTOLIN HFA) 108 (90 BASE)  MCG/ACT Inhaler Inhale 2 puffs into the lungs every 6 hours as needed for shortness of breath / dyspnea or wheezing 1 Inhaler 1     desvenlafaxine succinate (PRISTIQ) 50 MG 24 hr tablet Take 1 tablet (50 mg) by mouth daily 90 tablet 2     No Known Allergies  BP Readings from Last 3 Encounters:   07/11/17 135/84   07/06/17 127/73   06/23/17 128/80    Wt Readings from Last 3 Encounters:   07/11/17 196 lb (88.9 kg)   07/06/17 200 lb (90.7 kg)   06/23/17 203 lb (92.1 kg)                    Reviewed and updated as needed this visit by clinical staff  Tobacco  Allergies  Meds  Problems  Med Hx  Surg Hx  Fam Hx  Soc Hx        Reviewed and updated as needed this visit by Provider  Allergies  Meds  Problems         ROS:  Constitutional, HEENT, cardiovascular, pulmonary, gi and gu systems are negative, except as otherwise noted.    OBJECTIVE:     /84 (BP Location: Right arm, Patient Position: Chair, Cuff Size: Adult Large)  Pulse 78  Temp 98.1  F (36.7  C) (Oral)  Resp 14  Wt 196 lb (88.9 kg)  BMI 27.73 kg/m2  Body mass index is 27.73 kg/(m^2).  GENERAL: healthy, alert and no distress  EYES: Eyes grossly normal to inspection, PERRL and conjunctivae and sclerae normal  HENT: ear canals and TM's normal, nose and mouth without ulcers or lesions  NECK: no adenopathy, no asymmetry, masses, or scars and thyroid normal to palpation  SKIN: no suspicious lesions or rashes  PSYCH: mentation appears normal, affect normal/bright    Diagnostic Test Results:  none     ASSESSMENT/PLAN:       (R07.0) Throat pain  (primary encounter diagnosis)  Comment: symptoms appear resolving. Given course likely viral. Given description, possible hand foot and mouth. Esophagitis was considered, but given improvement, felt unlikely. Will attempt magic mouthwash prn and if symptoms return or fail to continue to improve, recommend ENT follow up.   Plan: magic mouthwash suspension (diphenhydramine,         lidocaine, aluminum-magnesium &  simethicone),         OTOLARYNGOLOGY REFERRAL        -Medication use and side effects discussed with the patient. Patient is in complete understanding and agreement with plan.       (R13.10) Dysphagia, unspecified type  Comment: as above. Was secondary to pain.   Plan: magic mouthwash suspension (diphenhydramine,         lidocaine, aluminum-magnesium & simethicone),         OTOLARYNGOLOGY REFERRAL              Follow up: as above     Curry Sampson PA-C  [facility]

## 2017-07-11 NOTE — MR AVS SNAPSHOT
After Visit Summary   7/11/2017    Chato Victor    MRN: [id number]           Patient Information     Date Of Birth          1995        Visit Information        Provider Department      7/11/2017 11:30 AM Curry Sampson PA-C [facility]        Today's Diagnoses     Throat pain    -  1    Dysphagia, unspecified type          Care Instructions      Hand, Foot & Mouth Disease (Child)    Hand, foot, and mouth disease (HFMD) is an illness caused by a virus. It is usually seen in infant and children younger than 10 years of age, but can occur in adults. This virus causes small ulcers in the mouth (throat, lips, cheeks, gums, and tongue) and small blisters or red spots may appear on the palms (hands), diaper area, and soles of the feet. There is usually a low-grade fever and poor appetite. HFMD is not a serious illness and usually go away in 1 to 2 weeks. The painful sores in the mouth may prevent your child from taking oral fluids well and result in dehydration.  It takes 3 to 5 days for the illness to appear in an exposed child. Generally, the HFMD is the most contagious during the first week of the illness. Sometimes, people can be contagious for days or weeks after the symptoms have disappeared. Adults who get infected with the HFMD may not have symptoms and may still be contagious.  HFMD can be transmitted from person to person by:    Touching your nose, mouth, eye after touching the stool of an infected person (has the virus)    Touching your nose, mouth, eye after touching fluid from the blisters/sores of an infected person    Respiratory secretions (sneezing, coughing, blowing your nose)    Touching contaminated objects (toys, doorknobs)    Oral secretions (kissing)  Home care  Mouth pain  Unless your doctor has prescribed another medicine for mouth pain:    Acetaminophen or ibuprofen may be used for pain or discomfort. Please consult your child's doctor before giving  your child acetaminophen or ibuprofen for dosing instructions and when to give the medicine (schedule).  Do not give ibuprofen to an infant 6 months of age or younger. Talk to your child's doctor before giving him or her over-the counter medicines.    Liquid antacid can be used 4 times per day to coat the mouth sores for pain relief.  Follow these instructions or do as directed by your child's doctor.    Children over age 4 can use 1 teaspoon (5 ml)  as a mouth rinse after meals.    For children under age 4, a parent can place 1/2 teaspoon (2.5 ml)  in the front of the mouth after meals.  Avoid regular mouth rinses because they may sting.  Feeding  Follow a soft diet with plenty of fluids to prevent dehydration. If your child doesn't want to eat solid foods, it's OK for a few days, as long as he or she drinks lots of fluid. Cool drinks and frozen treats (sherbet) are soothing and easier to take. Avoid citrus juices (orange juice, lemonade, etc.) and salty or spicy foods. These may cause more pain in the mouth sores.  Fever  You may use acetaminophen or ibuprofen for fever, as directed by your child's doctor. Talk to your child's doctor for dosing instructions and schedule. Do not give ibuprofen to an infant 6 months of age or younger. If your child has chronic liver or kidney disease or ever had a stomach ulcer or GI bleeding, talk with your doctor before using these medicines.  Aspirin should never be used in anyone under 18 years of age who is ill with a fever. It may cause severe disease (Reye Syndrome) or death.  Isolation  Children may return to day care or school once the fever is gone and they are eating and drinking well. Contact your healthcare provider and ask when your child (or you) is able to return to school (or work).  Follow up  Follow up with your doctor as directed by our staff.  When to seek medical care  Call your child's healthcare provider right away if any of these occur:    Your child  complains of neck or chest pain    Your child is having trouble breathing and lethargic    Your child is having trouble swallowing    Mouth ulcers are present after 2 weeks    Your child's condition is worse    Your child appear to be dehydrated (dry mouth, no tears, haven' t urinated is 8 or more hours)    Fever of 100.4 F (38 C) or higher, not better with fever medicine    Your child has repeated fevers above 104 F (40 C)    Your child is younger than 2 years old and their fever continues for more than 24 hours    Your child is 2 years old and older and their fever continues for more than 3 days  When to call 911  When to call 911 or seek medical care immediately :    Unusual fussiness, drowsiness or confusion    Dark purple rash    Trouble breathing    Seizure  Date Last Reviewed: 8/13/2015 2000-2017 [employer]. [address] [place] [address]. All rights reserved. This information is not intended as a substitute for professional medical care. Always follow your healthcare professional's instructions.                Follow-ups after your visit        Additional Services     OTOLARYNGOLOGY REFERRAL       Your provider has referred you to: N: [facility] [phone]   [email or web address]/    Please be aware that coverage of these services is subject to the terms and limitations of your health insurance plan.  Call member services at your health plan with any benefit or coverage questions.      Please bring the following with you to your appointment:    (1) Any X-Rays, CTs or MRIs which have been performed.  Contact the facility where they were done to arrange for  prior to your scheduled appointment.   (2) List of current medications  (3) This referral request   (4) Any documents/labs given to you for this referral                  Who to contact     If you have questions or need follow up  "information about today's clinic visit or your schedule please contact [facility] directly at [phone].  Normal or non-critical lab and imaging results will be communicated to you by MyChart, letter or phone within 4 business days after the clinic has received the results. If you do not hear from us within 7 days, please contact the clinic through AIThart or phone. If you have a critical or abnormal lab result, we will notify you by phone as soon as possible.  Submit refill requests through [employer] or call your pharmacy and they will forward the refill request to us. Please allow 3 business days for your refill to be completed.          Additional Information About Your Visit        AIThart Information     Zero[employer] lets you send messages to your doctor, view your test results, renew your prescriptions, schedule appointments and more. To sign up, go to [email or web address] . Click on \"Log in\" on the left side of the screen, which will take you to the Welcome page. Then click on \"Sign up Now\" on the right side of the page.     You will be asked to enter the access code listed below, as well as some personal information. Please follow the directions to create your username and password.     Your access code is: [email or web address]  Expires: 2017  3:32 PM     Your access code will  in 90 days. If you need help or a new code, please call your [place] clinic or [phone].        Care EveryWhere ID     This is your Care EveryWhere ID. This could be used by other organizations to access your [place] medical records  [id number]        Your Vitals Were     Pulse Temperature Respirations BMI (Body Mass Index)          78 98.1  F (36.7  C) (Oral) 14 27.73 kg/m2         Blood Pressure from Last 3 Encounters:   17 135/84   17 127/73   17 128/80    Weight from Last 3 Encounters:   17 196 lb (88.9 kg)   17 200 lb (90.7 kg)   17 203 lb (92.1 kg)              We " Performed the Following     OTOLARYNGOLOGY REFERRAL          Today's Medication Changes          These changes are accurate as of: 7/11/17 12:17 PM.  If you have any questions, ask your nurse or doctor.               Start taking these medicines.        Dose/Directions    lidocaine visc 2% & diphenhydramine 12.5mg/5mL & maalox/mylanta w/simethicone (1:1:1 v/v/v) Susp compounding kit   Used for:  Dysphagia, unspecified type, Throat pain   Started by:  Curry Sampson PA-C        Dose:  5-10 mL   Swish and swallow 5-10 mLs in mouth every 6 hours as needed for mouth sores   Quantity:  237 mL   Refills:  1            Where to get your medicines      These medications were sent to [facility] [address] [place] [id number]    Hours:  24-hours Phone:  [phone]     lidocaine visc 2% & diphenhydramine 12.5mg/5mL & maalox/mylanta w/simethicone (1:1:1 v/v/v) Susp compounding kit                Primary Care Provider Office Phone # Fax #    Curry Sampson PA-C [phone]       [address] [address]        Equal Access to Services     KEVIN SANCHEZ AH: Hadii jay ku hadasho Soандрейali, waaxda luqadaha, qaybta kaalmada adeegyada, abhay delgado . So [facility] [phone].    ATENCIÓN: Si habla español, tiene a hood disposición servicios gratuitos de asistencia lingüística. Jhoan al [phone].    We comply with applicable federal civil rights laws and [place] laws. We do not discriminate on the basis of race, color, national origin, age, disability sex, sexual orientation or gender identity.            Thank you!     Thank you for choosing [facility]  for your care. Our goal is always to provide you with excellent care. Hearing back from our patients is one way we can continue to improve our services. Please take a  few minutes to complete the written survey that you may receive in the mail after your visit with us. Thank you!             Your Updated Medication List - Protect others around you: Learn how to safely use, store and throw away your medicines at www.disposemymeds.org.          This list is accurate as of: 7/11/17 12:17 PM.  Always use your most recent med list.                   Brand Name Dispense Instructions for use Diagnosis    albuterol 108 (90 BASE) MCG/ACT Inhaler    PROAIR HFA/PROVENTIL HFA/VENTOLIN HFA    1 Inhaler    Inhale 2 puffs into the lungs every 6 hours as needed for shortness of breath / dyspnea or wheezing        desvenlafaxine succinate 50 MG 24 hr tablet    PRISTIQ    90 tablet    Take 1 tablet (50 mg) by mouth daily    Major depressive disorder, recurrent episode, mild (H), Anxiety       EPINEPHrine 0.3 MG/0.3ML injection      INJECT 0.3 ML INTRAMUSCULARLY AS NEEDED. MAY REPEAT        lidocaine visc 2% & diphenhydramine 12.5mg/5mL & maalox/mylanta w/simethicone (1:1:1 v/v/v) Susp compounding kit     237 mL    Swish and swallow 5-10 mLs in mouth every 6 hours as needed for mouth sores    Dysphagia, unspecified type, Throat pain

## 2017-07-11 NOTE — PATIENT INSTRUCTIONS

## 2017-07-25 ENCOUNTER — OFFICE VISIT (OUTPATIENT)
Dept: FAMILY MEDICINE | Facility: CLINIC | Age: 22
End: 2017-07-25
Payer: COMMERCIAL

## 2017-07-25 VITALS
SYSTOLIC BLOOD PRESSURE: 139 MMHG | TEMPERATURE: 98.2 F | HEART RATE: 53 BPM | RESPIRATION RATE: 16 BRPM | BODY MASS INDEX: 27.3 KG/M2 | WEIGHT: 193 LBS | DIASTOLIC BLOOD PRESSURE: 72 MMHG

## 2017-07-25 DIAGNOSIS — N34.2 URETHRITIS: Primary | ICD-10-CM

## 2017-07-25 PROCEDURE — 99213 OFFICE O/P EST LOW 20 MIN: CPT | Performed by: PHYSICIAN ASSISTANT

## 2017-07-25 PROCEDURE — 87591 N.GONORRHOEAE DNA AMP PROB: CPT | Performed by: PHYSICIAN ASSISTANT

## 2017-07-25 PROCEDURE — 87491 CHLMYD TRACH DNA AMP PROBE: CPT | Performed by: PHYSICIAN ASSISTANT

## 2017-07-25 RX ORDER — AZITHROMYCIN 500 MG/1
1000 TABLET, FILM COATED ORAL ONCE
Qty: 2 TABLET | Refills: 0 | Status: SHIPPED | OUTPATIENT
Start: 2017-07-25 | End: 2017-07-25

## 2017-07-25 NOTE — MR AVS SNAPSHOT
After Visit Summary   7/25/2017    Chato Victor    MRN: [id number]           Patient Information     Date Of Birth          1995        Visit Information        Provider Department      7/25/2017 10:15 AM Curry Sampson PA-C [facility]        Today's Diagnoses     Urethritis    -  1      Care Instructions      Urethritis in Men     An inflamed urethra can cause pain during urination.   The urethra is the tube that runs from the bladder through the penis. When the urethra is inflamed, it is called urethritis. The urethra becomes swollen and causes burning pain when you urinate. Other symptoms of urethritis may include itching or tingling of the penis or pus discharge from the penis. You may also have pain with sex and masturbation. Some men may not have symptoms.  What causes urethritis?  Urethritis can be caused by a bacterial or viral infection. Such an infection can lead to conditions such as a urinary tract infection (UTI) or sexually transmitted diseases (STD). Urethritis can also be caused by injury or sensitivity or allergy to chemicals in lotions and other products.  How is urethritis diagnosed?  Your healthcare provider will examine you and ask about your symptoms and health history. You may also have one or more of the following tests:    Urine test to take samples of urine and have them checked for problems.    Blood test to take a sample of blood and have it checked for problems.    Urethral discharge to take a sample of fluid from inside the urethra. A cotton swab is inserted into the opening of the penis and into the urethra.    Cystoscopy to allow the healthcare provider to look for problems in the urinary tract. The test uses a thin, flexible telescope called a cystoscope with a light and camera attached. The scope is inserted into the urethra.  How is urethritis treated?  Treatment depends on the cause of urethritis. If it s due to a bacterial infection,  antibiotics (medicines that fight infection) will be given. Your healthcare provider can tell you more about your treatment options. In the meantime, your symptoms can be treated. To relieve pain and swelling, anti-inflammatory medications, such as ibuprofen, may be given. Untreated, symptoms may get worse. Also, scar tissue can form in the urethra, causing it to narrow.  When to call your healthcare provider   Call your healthcare provider right away if you have any of the following:    Fever of 100.4 F (38.0 C) or higher     Blood in the urine or semen    Burning pain with urination    Increased urge to urinate    Discharge from the penis    Itching, tenderness, or swelling in the penis or groin    Pain during sex or masturbation   Preventing STDs  When it comes to sex, it s important to take care and be safe. Any sexual contact with the penis, vagina, anus, or mouth can spread an STD. The only sure way to prevent STDs is not having sex. But there are ways to make sex safer. Use a latex condom each time you have sex. And talk to your partner about STDs before you have sex.  Date Last Reviewed: 1/1/2017 2000-2017 [employer]. [address] [place] [address]. All rights reserved. This information is not intended as a substitute for professional medical care. Always follow your healthcare professional's instructions.                Follow-ups after your visit        Who to contact     If you have questions or need follow up information about today's clinic visit or your schedule please contact [facility] directly at [phone].  Normal or non-critical lab and imaging results will be communicated to you by MyChart, letter or phone within 4 business days after the clinic has received the results. If you do not hear from us within 7 days, please contact the clinic through MyChart or phone. If you have a critical or abnormal lab result, we will notify you by phone as soon  "as possible.  Submit refill requests through [employer] or call your pharmacy and they will forward the refill request to us. Please allow 3 business days for your refill to be completed.          Additional Information About Your Visit        [employer] Information     [employer] lets you send messages to your doctor, view your test results, renew your prescriptions, schedule appointments and more. To sign up, go to [email or web address] . Click on \"Log in\" on the left side of the screen, which will take you to the Welcome page. Then click on \"Sign up Now\" on the right side of the page.     You will be asked to enter the access code listed below, as well as some personal information. Please follow the directions to create your username and password.     Your access code is: [email or web address]  Expires: 2017  3:32 PM     Your access code will  in 90 days. If you need help or a new code, please call your [place] clinic or [phone].        Care EveryWhere ID     This is your Care EveryWhere ID. This could be used by other organizations to access your [place] medical records  [id number]        Your Vitals Were     Pulse Temperature Respirations BMI (Body Mass Index)          53 98.2  F (36.8  C) (Oral) 16 27.3 kg/m2         Blood Pressure from Last 3 Encounters:   17 139/72   17 135/84   17 127/73    Weight from Last 3 Encounters:   17 193 lb (87.5 kg)   17 196 lb (88.9 kg)   17 200 lb (90.7 kg)              We Performed the Following     Chlamydia trachomatis PCR     Neisseria gonorrhoeae PCR          Today's Medication Changes          These changes are accurate as of: 17 10:32 AM.  If you have any questions, ask your nurse or doctor.               Start taking these medicines.        Dose/Directions    azithromycin 500 MG tablet   Commonly known as:  ZITHROMAX   Used for:  Urethritis   Started by:  Curry Sampson PA-C        Dose:  1000 mg   Take 2 tablets " (1,000 mg) by mouth once for 1 dose   Quantity:  2 tablet   Refills:  0            Where to get your medicines      These medications were sent to [facility] [address] [facility] [address] AT [place] [address]  [address] [facility] [address]    Hours:  24-hours Phone:  [phone]     azithromycin 500 MG tablet                Primary Care Provider Office Phone # Fax #    Curry Raul Sampson PA-C [phone]       [facility] [address] [facility] [address]        Equal Access to Services     [facility] Hospital[facility]LENA : Hadii aad ku hadasho Soomaali, waaxda luqadaha, qaybta kaalmada adeegyada, waxyanick mcnulty haydenzeln adelore delgado . So [facility] [phone].    ATENCIÓN: Si habla español, tiene a hood disposición servicios gratuitos de asistencia lingüística. Ll[facility] [phone].    We comply with applicable federal civil rights laws and [place] laws. We do not discriminate on the basis of race, color, national origin, age, disability sex, sexual orientation or gender identity.            Thank you!     Thank you for choosing [facility]  for your care. Our goal is always to provide you with excellent care. Hearing back from our patients is one way we can continue to improve our services. Please take a few minutes to complete the written survey that you may receive in the mail after your visit with us. Thank you!             Your Updated Medication List - Protect others around you: Learn how to safely use, store and throw away your medicines at www.disposemymeds.org.          This list is accurate as of: 7/25/17 10:32 AM.  Always use your most recent med list.                   Brand Name Dispense Instructions for use Diagnosis    albuterol 108 (90 BASE) MCG/ACT Inhaler    PROAIR HFA/PROVENTIL HFA/VENTOLIN HFA    1 Inhaler    Inhale 2 puffs into the lungs every 6 hours as needed for shortness of breath / dyspnea or wheezing         azithromycin 500 MG tablet    ZITHROMAX    2 tablet    Take 2 tablets (1,000 mg) by mouth once for 1 dose    Urethritis       desvenlafaxine succinate 50 MG 24 hr tablet    PRISTIQ    90 tablet    Take 1 tablet (50 mg) by mouth daily    Major depressive disorder, recurrent episode, mild (H), Anxiety       EPINEPHrine 0.3 MG/0.3ML injection 2-pack    EPIPEN/ADRENACLICK/or ANY BX GENERIC EQUIV     INJECT 0.3 ML INTRAMUSCULARLY AS NEEDED. MAY REPEAT        lidocaine visc 2% & diphenhydramine 12.5mg/5mL & maalox/mylanta w/simethicone (1:1:1 v/v/v) Susp compounding kit     237 mL    Swish and swallow 5-10 mLs in mouth every 6 hours as needed for mouth sores    Dysphagia, unspecified type, Throat pain

## 2017-07-25 NOTE — PATIENT INSTRUCTIONS
Urethritis in Men     An inflamed urethra can cause pain during urination.   The urethra is the tube that runs from the bladder through the penis. When the urethra is inflamed, it is called urethritis. The urethra becomes swollen and causes burning pain when you urinate. Other symptoms of urethritis may include itching or tingling of the penis or pus discharge from the penis. You may also have pain with sex and masturbation. Some men may not have symptoms.  What causes urethritis?  Urethritis can be caused by a bacterial or viral infection. Such an infection can lead to conditions such as a urinary tract infection (UTI) or sexually transmitted diseases (STD). Urethritis can also be caused by injury or sensitivity or allergy to chemicals in lotions and other products.  How is urethritis diagnosed?  Your healthcare provider will examine you and ask about your symptoms and health history. You may also have one or more of the following tests:    Urine test to take samples of urine and have them checked for problems.    Blood test to take a sample of blood and have it checked for problems.    Urethral discharge to take a sample of fluid from inside the urethra. A cotton swab is inserted into the opening of the penis and into the urethra.    Cystoscopy to allow the healthcare provider to look for problems in the urinary tract. The test uses a thin, flexible telescope called a cystoscope with a light and camera attached. The scope is inserted into the urethra.  How is urethritis treated?  Treatment depends on the cause of urethritis. If it s due to a bacterial infection, antibiotics (medicines that fight infection) will be given. Your healthcare provider can tell you more about your treatment options. In the meantime, your symptoms can be treated. To relieve pain and swelling, anti-inflammatory medications, such as ibuprofen, may be given. Untreated, symptoms may get worse. Also, scar tissue can form in the urethra,  causing it to narrow.  When to call your healthcare provider   Call your healthcare provider right away if you have any of the following:    Fever of 100.4 F (38.0 C) or higher     Blood in the urine or semen    Burning pain with urination    Increased urge to urinate    Discharge from the penis    Itching, tenderness, or swelling in the penis or groin    Pain during sex or masturbation   Preventing STDs  When it comes to sex, it s important to take care and be safe. Any sexual contact with the penis, vagina, anus, or mouth can spread an STD. The only sure way to prevent STDs is not having sex. But there are ways to make sex safer. Use a latex condom each time you have sex. And talk to your partner about STDs before you have sex.  Date Last Reviewed: 1/1/2017 2000-2017 [employer]. [address] [place] [address]. All rights reserved. This information is not intended as a substitute for professional medical care. Always follow your healthcare professional's instructions.

## 2017-07-25 NOTE — NURSING NOTE
"  Chief Complaint   Patient presents with     STD       Initial /72 (BP Location: Right arm, Patient Position: Chair, Cuff Size: Adult Large)  Pulse 53  Temp 98.2  F (36.8  C) (Oral)  Resp 16  Wt 193 lb (87.5 kg)  BMI 27.3 kg/m2 Estimated body mass index is 27.3 kg/(m^2) as calculated from the following:    Height as of 6/23/17: 5' 10.5\" (1.791 m).    Weight as of this encounter: 193 lb (87.5 kg).  Medication Reconciliation: complete      COLLEEN Esquivel    "

## 2017-07-25 NOTE — PROGRESS NOTES
SUBJECTIVE:                                                    Chato Victor is a 21 year old male who presents to clinic today for the following health issues:      -STD recheck + chlamydia about 1 month ago, patient c/o burning/itching in tip of penis. states sx resolved and then returned. No new partners but does state current partner was just treated for chlamydia. Is afraid they have been passing it back and forth. No testicular pain. No fever or chills. No new soaps or detergents.       Problem list and histories reviewed & adjusted, as indicated.  Additional history: as documented    Patient Active Problem List   Diagnosis     Major depressive disorder, recurrent episode, mild (H)     Anxiety     History reviewed. No pertinent surgical history.    Social History   Substance Use Topics     Smoking status: Current Some Day Smoker     Smokeless tobacco: Never Used      Comment: socially     Alcohol use 0.0 oz/week     0 Standard drinks or equivalent per week      Comment: socially      Family History   Problem Relation Age of Onset     Coronary Artery Disease No family hx of      DIABETES No family hx of          Current Outpatient Prescriptions   Medication Sig Dispense Refill     azithromycin (ZITHROMAX) 500 MG tablet Take 2 tablets (1,000 mg) by mouth once for 1 dose 2 tablet 0     magic mouthwash suspension (diphenhydramine, lidocaine, aluminum-magnesium & simethicone) Swish and swallow 5-10 mLs in mouth every 6 hours as needed for mouth sores 237 mL 1     EPINEPHrine 0.3 MG/0.3ML injection INJECT 0.3 ML INTRAMUSCULARLY AS NEEDED. MAY REPEAT  11     albuterol (PROAIR HFA/PROVENTIL HFA/VENTOLIN HFA) 108 (90 BASE) MCG/ACT Inhaler Inhale 2 puffs into the lungs every 6 hours as needed for shortness of breath / dyspnea or wheezing 1 Inhaler 1     desvenlafaxine succinate (PRISTIQ) 50 MG 24 hr tablet Take 1 tablet (50 mg) by mouth daily 90 tablet 2     No Known Allergies  BP Readings from Last 3 Encounters:    07/25/17 139/72   07/11/17 135/84   07/06/17 127/73    Wt Readings from Last 3 Encounters:   07/25/17 193 lb (87.5 kg)   07/11/17 196 lb (88.9 kg)   07/06/17 200 lb (90.7 kg)                        Reviewed and updated as needed this visit by clinical staffTobacco  Allergies  Meds  Problems  Med Hx  Surg Hx  Fam Hx  Soc Hx        Reviewed and updated as needed this visit by Provider  Allergies  Meds  Problems         ROS:  Constitutional, HEENT, cardiovascular, pulmonary, gi and gu systems are negative, except as otherwise noted.      OBJECTIVE:   /72 (BP Location: Right arm, Patient Position: Chair, Cuff Size: Adult Large)  Pulse 53  Temp 98.2  F (36.8  C) (Oral)  Resp 16  Wt 193 lb (87.5 kg)  BMI 27.3 kg/m2  Body mass index is 27.3 kg/(m^2).  GENERAL: healthy, alert and no distress   (male): normal male genitalia without lesions or urethral discharge, no hernia  PSYCH: mentation appears normal, affect normal/bright    Diagnostic Test Results:  none     ASSESSMENT/PLAN:     (N34.2) Urethritis  (primary encounter diagnosis)  Comment: with history of chlamydia, strongly suggests cause. Will screen for both chlamydia and gonorrhea and will initiate treatment for chlamydia. No intercourse for at least 1 week. If positive, lab only recheck in 3 weeks.   Plan: Chlamydia trachomatis PCR, Neisseria         gonorrhoeae PCR, azithromycin (ZITHROMAX) 500         MG tablet        -Medication use and side effects discussed with the patient. Patient is in complete understanding and agreement with plan.         Follow up: pending labs     Curry Sampson PA-C  [facility]

## 2017-07-26 ENCOUNTER — TELEPHONE (OUTPATIENT)
Dept: FAMILY MEDICINE | Facility: CLINIC | Age: 22
End: 2017-07-26

## 2017-07-26 DIAGNOSIS — A74.9 CHLAMYDIA INFECTION: Primary | ICD-10-CM

## 2017-07-26 NOTE — LETTER
[facility]  [address] [address] [address]  [phone]        October 2, 2017    Chato Victor  [address] [address] [address]              Dear Chato Victor    This is to remind you that your recheck lab work is due. Please be prepared to leave a urine sample at your appointment. It must have been at least an hour since your last urination to leave the sample.     You may call our office at [phone] to schedule an appointment.    Please disregard this notice if you have already had your labs drawn or made an appointment.        Sincerely,        Curry Sampson PA-C/ todd

## 2017-07-26 NOTE — TELEPHONE ENCOUNTER
BONITA Avendaño   Lab staff reports 7/25/17 positive chlamydia to RN.   Pt informed.  Verified he took azithromycin 1000 mg 7/25/17. And informed gonorrhea negative   Reviewed Jarocho's plan:  No intercourse 1 week and Lab only 3 weeks - will be out of town so next available 8/28/17 LO scheduled.   Chlamydia disease report form completed, faxed to [place] [facility], documented in clinic log, and form forwarded to abstracting.   Garcia Valencia RN

## 2018-02-08 ENCOUNTER — ALLIED HEALTH/NURSE VISIT (OUTPATIENT)
Dept: NURSING | Facility: CLINIC | Age: 23
End: 2018-02-08
Payer: COMMERCIAL

## 2018-02-08 DIAGNOSIS — Z23 NEED FOR PROPHYLACTIC VACCINATION AND INOCULATION AGAINST INFLUENZA: Primary | ICD-10-CM

## 2018-02-08 DIAGNOSIS — Z11.1 SCREENING FOR TUBERCULOSIS: ICD-10-CM

## 2018-02-08 PROCEDURE — 90471 IMMUNIZATION ADMIN: CPT

## 2018-02-08 PROCEDURE — 90686 IIV4 VACC NO PRSV 0.5 ML IM: CPT

## 2018-02-08 PROCEDURE — 99207 ZZC NO CHARGE NURSE ONLY: CPT

## 2018-02-08 NOTE — PROGRESS NOTES

## 2018-02-08 NOTE — MR AVS SNAPSHOT
After Visit Summary   2/8/2018    Chato Victor    MRN: [id number]           Patient Information     Date Of Birth          1995        Visit Information        Provider Department      2/8/2018 4:15 PM CR BRONZE [facility]/BHAVNA [facility]        Today's Diagnoses     Need for prophylactic vaccination and inoculation against influenza    -  1    Screening for tuberculosis           Follow-ups after your visit        Your next 10 appointments already scheduled     Feb 10, 2018 11:00 AM CST   LAB with CR LAB   [facility] ([facility])    [id number] [place] [address] [phone]           Please do not eat 10-12 hours before your appointment if you are coming in fasting for labs on lipids, cholesterol, or glucose (sugar). This does not apply to pregnant women. Water, hot tea and black coffee (with nothing added) are okay. Do not drink other fluids, diet soda or chew gum.              Future tests that were ordered for you today     Open Future Orders        Priority Expected Expires Ordered    M Tuberculosis by Quantiferon Routine 2/10/2018 2/8/2019 2/8/2018            Who to contact     If you have questions or need follow up information about today's clinic visit or your schedule please contact [facility] directly at [phone].  Normal or non-critical lab and imaging results will be communicated to you by MyChart, letter or phone within 4 business days after the clinic has received the results. If you do not hear from us within 7 days, please contact the clinic through MyChart or phone. If you have a critical or abnormal lab result, we will notify you by phone as soon as possible.  Submit refill requests through [employer] or call your pharmacy and they will forward the refill request to us. Please allow 3 business days for your refill to be completed.          Additional Information About Your Visit       "  MyChart Information     [employer] lets you send messages to your doctor, view your test results, renew your prescriptions, schedule appointments and more. To sign up, go to [email or web address] . Click on \"Log in\" on the left side of the screen, which will take you to the Welcome page. Then click on \"Sign up Now\" on the right side of the page.     You will be asked to enter the access code listed below, as well as some personal information. Please follow the directions to create your username and password.     Your access code is: [email or web address]  Expires: 2018  4:22 PM     Your access code will  in 90 days. If you need help or a new code, please call your [place] clinic or [phone].        Care EveryWhere ID     This is your Care EveryWhere ID. This could be used by other organizations to access your [place] medical records  [id number]         Blood Pressure from Last 3 Encounters:   17 139/72   17 135/84   17 127/73    Weight from Last 3 Encounters:   17 193 lb (87.5 kg)   17 196 lb (88.9 kg)   17 200 lb (90.7 kg)              We Performed the Following     FLU VAC, SPLIT VIRUS IM > 3 YO (QUADRIVALENT) [86717]        Primary Care Provider Office Phone # Fax #    Curry Raul Sampson PA-C [phone] [address] [address] [facility] [address]        Equal Access to Services     KEVIN SANCHEZ : Hadii jay cheno Sokayy, waaxda luqadaha, qaybta kaalmada blossom, abhay gonzales. So [facility] [phone].    ATENCIÓN: Si habla español, tiene a ohod disposición servicios gratuitos de asistencia lingüística. Llame al [phone].    We comply with applicable federal civil rights laws and [place] laws. We do not discriminate on the basis of race, color, national origin, age, disability, sex, sexual orientation, or gender identity.            Thank you!     Thank you for choosing [facility]  for your " care. Our goal is always to provide you with excellent care. Hearing back from our patients is one way we can continue to improve our services. Please take a few minutes to complete the written survey that you may receive in the mail after your visit with us. Thank you!             Your Updated Medication List - Protect others around you: Learn how to safely use, store and throw away your medicines at www.disposemymeds.org.          This list is accurate as of 2/8/18  4:22 PM.  Always use your most recent med list.                   Brand Name Dispense Instructions for use Diagnosis    albuterol 108 (90 BASE) MCG/ACT Inhaler    PROAIR HFA/PROVENTIL HFA/VENTOLIN HFA    1 Inhaler    Inhale 2 puffs into the lungs every 6 hours as needed for shortness of breath / dyspnea or wheezing        desvenlafaxine succinate 50 MG 24 hr tablet    PRISTIQ    90 tablet    Take 1 tablet (50 mg) by mouth daily    Major depressive disorder, recurrent episode, mild (H), Anxiety       EPINEPHrine 0.3 MG/0.3ML injection 2-pack    EPIPEN/ADRENACLICK/or ANY BX GENERIC EQUIV     INJECT 0.3 ML INTRAMUSCULARLY AS NEEDED. MAY REPEAT        magic mouthwash suspension (diphenhydrAMINE, lidocaine, aluminum-magnesium & simethicone) Susp compounding kit     237 mL    Swish and swallow 5-10 mLs in mouth every 6 hours as needed for mouth sores    Dysphagia, unspecified type, Throat pain

## 2018-02-10 DIAGNOSIS — A74.9 CHLAMYDIA INFECTION: ICD-10-CM

## 2018-02-10 DIAGNOSIS — Z11.1 SCREENING FOR TUBERCULOSIS: ICD-10-CM

## 2018-02-10 PROCEDURE — 36415 COLL VENOUS BLD VENIPUNCTURE: CPT | Performed by: PHYSICIAN ASSISTANT

## 2018-02-10 PROCEDURE — 86480 TB TEST CELL IMMUN MEASURE: CPT | Performed by: PHYSICIAN ASSISTANT

## 2018-02-10 PROCEDURE — 87491 CHLMYD TRACH DNA AMP PROBE: CPT | Performed by: PHYSICIAN ASSISTANT

## 2018-02-10 NOTE — LETTER
February 12, 2018      Chato JULIAN Valente  [address] [address] [address]        Dear ,    We are writing to inform you of your test results.    Your test results fall within the expected range(s) or remain unchanged from previous results.  Please continue with current treatment plan.    Resulted Orders   M Tuberculosis by Quantiferon   Result Value Ref Range    M Tuberculosis Result Negative NEG^Negative    M Tuberculosis Antigen Value 0.00 IU/mL      Comment:      This is a qualitative test.  The TB antigen IU/mL value is required for   documentation on certain government reporting forms but this value should not   be used to monitor disease progression or response to therapy.  Diagnosing or excluding tuberculosis disease, and assessing the probability of   LTBI, require a combination of epidemiological, historical, medical and   diagnostic findings that should be taken into account when interpreting   QuantiFERON TB results.     Chlamydia trachomatis PCR   Result Value Ref Range    Specimen Description Urine     Chlamydia Trachomatis PCR Negative NEG^Negative      Comment:      Negative for C. trachomatis rRNA by transcription mediated amplification.  A negative result by transcription mediated amplification does not preclude   the presence of C. trachomatis infection because results are dependent on   proper and adequate collection, absence of inhibitors, and sufficient rRNA to   be detected.         If you have any questions or concerns, please call the clinic at the number listed above.       Sincerely,        [facility]     Curry Sampson PA-C/austyn

## 2018-02-10 NOTE — MR AVS SNAPSHOT
"              After Visit Summary   2/10/2018    Chato Victor    MRN: [id number]           Patient Information     Date Of Birth          1995        Visit Information        Provider Department      2/10/2018 11:00 AM CR LAB [facility]        Today's Diagnoses     Screening for tuberculosis           Follow-ups after your visit        Who to contact     If you have questions or need follow up information about today's clinic visit or your schedule please contact [facility] directly at [phone].  Normal or non-critical lab and imaging results will be communicated to you by MyChart, letter or phone within 4 business days after the clinic has received the results. If you do not hear from us within 7 days, please contact the clinic through [employer] or phone. If you have a critical or abnormal lab result, we will notify you by phone as soon as possible.  Submit refill requests through [employer] or call your pharmacy and they will forward the refill request to us. Please allow 3 business days for your refill to be completed.          Additional Information About Your Visit        MyChart Information     [employer] lets you send messages to your doctor, view your test results, renew your prescriptions, schedule appointments and more. To sign up, go to [email or web address] . Click on \"Log in\" on the left side of the screen, which will take you to the Welcome page. Then click on \"Sign up Now\" on the right side of the page.     You will be asked to enter the access code listed below, as well as some personal information. Please follow the directions to create your username and password.     Your access code is: [email or web address]  Expires: 2018  4:22 PM     Your access code will  in 90 days. If you need help or a new code, please call your [facility] or [phone].        Care EveryWhere ID     This is your Care EveryWhere ID. This could be used by other organizations " to access your [place] medical records  [id number]         Blood Pressure from Last 3 Encounters:   07/25/17 139/72   07/11/17 135/84   07/06/17 127/73    Weight from Last 3 Encounters:   07/25/17 193 lb (87.5 kg)   07/11/17 196 lb (88.9 kg)   07/06/17 200 lb (90.7 kg)              We Performed the Following     M Tuberculosis by Quantiferon        Primary Care Provider Office Phone # Fax #    Curry Raul Sampson PA-C [phone] [address] [facility] [address]        Equal Access to Services     [facility] : Hadii aad sarath hadvictor hugoo Sokayy, waaxda luqadaha, qaybta kaalmada adeloreyada, abhay delgado . So [facility] [phone].    ATENCIÓN: Si habla español, tiene a hood disposición servicios gratuitos de asistencia lingüística. [facility] [phone].    We comply with applicable federal civil rights laws and [place] laws. We do not discriminate on the basis of race, color, national origin, age, disability, sex, sexual orientation, or gender identity.            Thank you!     Thank you for choosing [facility]  for your care. Our goal is always to provide you with excellent care. Hearing back from our patients is one way we can continue to improve our services. Please take a few minutes to complete the written survey that you may receive in the mail after your visit with us. Thank you!             Your Updated Medication List - Protect others around you: Learn how to safely use, store and throw away your medicines at www.disposemymeds.org.          This list is accurate as of 2/10/18 11:18 AM.  Always use your most recent med list.                   Brand Name Dispense Instructions for use Diagnosis    albuterol 108 (90 BASE) MCG/ACT Inhaler    PROAIR HFA/PROVENTIL HFA/VENTOLIN HFA    1 Inhaler    Inhale 2 puffs into the lungs every 6 hours as needed for shortness of breath / dyspnea or wheezing        desvenlafaxine succinate 50 MG 24 hr tablet     PRISTIQ    90 tablet    Take 1 tablet (50 mg) by mouth daily    Major depressive disorder, recurrent episode, mild (H), Anxiety       EPINEPHrine 0.3 MG/0.3ML injection 2-pack    EPIPEN/ADRENACLICK/or ANY BX GENERIC EQUIV     INJECT 0.3 ML INTRAMUSCULARLY AS NEEDED. MAY REPEAT        magic mouthwash suspension (diphenhydrAMINE, lidocaine, aluminum-magnesium & simethicone) Susp compounding kit     237 mL    Swish and swallow 5-10 mLs in mouth every 6 hours as needed for mouth sores    Dysphagia, unspecified type, Throat pain

## 2018-02-11 LAB
C TRACH DNA SPEC QL NAA+PROBE: NEGATIVE
SPECIMEN SOURCE: NORMAL

## 2018-02-12 LAB
M TB TUBERC IFN-G BLD QL: NEGATIVE
M TB TUBERC IFN-G/MITOGEN IGNF BLD: 0 IU/ML

## 2018-05-07 DIAGNOSIS — F41.9 ANXIETY: ICD-10-CM

## 2018-05-07 DIAGNOSIS — F33.0 MAJOR DEPRESSIVE DISORDER, RECURRENT EPISODE, MILD (H): ICD-10-CM

## 2018-05-07 NOTE — TELEPHONE ENCOUNTER
"Requested Prescriptions   Pending Prescriptions Disp Refills     desvenlafaxine succinate (PRISTIQ) 50 MG 24 hr tablet [Pharmacy Med Name: DESVENLAFAXINE SUC ER 50 MG TB] 90 tablet 1    Last Written Prescription Date:  6/5/17  Last Fill Quantity: 90,  # refills: 2   Last Office Visit: 7/25/2017   Future Office Visit:      Sig: TAKE 1 TABLET (50 MG) BY MOUTH DAILY    Serotonin-Norepinephrine Reuptake Inhibitors  Failed    5/7/2018  1:43 AM       Failed - PHQ-9 score of less than 5 in past 6 months    PHQ-9 SCORE 12/29/2016 2/28/2017   Total Score 3 4     INDIGO-7 SCORE 12/29/2016 2/28/2017   Total Score 3 7          Failed - Normal serum creatinine on file in past 12 months    No lab results found.         Failed - Recent (6 mo) or future (30 days) visit within the authorizing provider's specialty    Patient had office visit in the last 6 months or has a visit in the next 30 days with authorizing provider or within the authorizing provider's specialty.  See \"Patient Info\" tab in inbasket, or \"Choose Columns\" in Meds & Orders section of the refill encounter.           Passed - Blood pressure under 140/90 in past 12 months    BP Readings from Last 3 Encounters:   07/25/17 139/72   07/11/17 135/84   07/06/17 127/73                Passed - Patient is age 18 or older          "

## 2018-05-09 ENCOUNTER — OFFICE VISIT (OUTPATIENT)
Dept: FAMILY MEDICINE | Facility: CLINIC | Age: 23
End: 2018-05-09
Payer: COMMERCIAL

## 2018-05-09 VITALS
HEART RATE: 83 BPM | OXYGEN SATURATION: 97 % | WEIGHT: 199 LBS | BODY MASS INDEX: 28.15 KG/M2 | DIASTOLIC BLOOD PRESSURE: 80 MMHG | TEMPERATURE: 97.9 F | SYSTOLIC BLOOD PRESSURE: 131 MMHG

## 2018-05-09 DIAGNOSIS — F33.0 MAJOR DEPRESSIVE DISORDER, RECURRENT EPISODE, MILD (H): ICD-10-CM

## 2018-05-09 DIAGNOSIS — F43.22 ADJUSTMENT DISORDER WITH ANXIOUS MOOD: Primary | ICD-10-CM

## 2018-05-09 DIAGNOSIS — F41.9 ANXIETY: ICD-10-CM

## 2018-05-09 PROCEDURE — 99213 OFFICE O/P EST LOW 20 MIN: CPT | Performed by: FAMILY MEDICINE

## 2018-05-09 RX ORDER — DESVENLAFAXINE 50 MG/1
TABLET, FILM COATED, EXTENDED RELEASE ORAL
Qty: 30 TABLET | Refills: 3 | Status: SHIPPED | OUTPATIENT
Start: 2018-05-09 | End: 2018-09-14

## 2018-05-09 RX ORDER — DESVENLAFAXINE 50 MG/1
TABLET, FILM COATED, EXTENDED RELEASE ORAL
Qty: 30 TABLET | Refills: 0 | Status: SHIPPED | OUTPATIENT
Start: 2018-05-09 | End: 2018-05-09

## 2018-05-09 NOTE — MR AVS SNAPSHOT
After Visit Summary   5/9/2018    Chato Victor    MRN: [id number]           Patient Information     Date Of Birth          1995        Visit Information        Provider Department      5/9/2018 3:30 PM Cesar De La Torre MD [facility]        Today's Diagnoses     Adjustment disorder with anxious mood    -  1    Major depressive disorder, recurrent episode, mild (H)        Anxiety          Care Instructions    Call Abel for evaluation           Follow-ups after your visit        Follow-up notes from your care team     Return in about 3 months (around 8/9/2018).      Who to contact     If you have questions or need follow up information about today's clinic visit or your schedule please contact [facility] directly at [phone].  Normal or non-critical lab and imaging results will be communicated to you by [employer], letter or phone within 4 business days after the clinic has received the results. If you do not hear from us within 7 days, please contact the clinic through [employer] or phone. If you have a critical or abnormal lab result, we will notify you by phone as soon as possible.  Submit refill requests through [employer] or call your pharmacy and they will forward the refill request to us. Please allow 3 business days for your refill to be completed.          Additional Information About Your Visit        MyChart Information     [employer] gives you secure access to your electronic health record. If you see a primary care provider, you can also send messages to your care team and make appointments. If you have questions, please call your primary care clinic.  If you do not have a primary care provider, please call [phone] and they will assist you.        Care EveryWhere ID     This is your Care EveryWhere ID. This could be used by other organizations to access your [place] medical records  [id number]        Your Vitals Were     Pulse Temperature  Pulse Oximetry BMI (Body Mass Index)          83 97.9  F (36.6  C) (Oral) 97% 28.15 kg/m2         Blood Pressure from Last 3 Encounters:   05/09/18 131/80   07/25/17 139/72   07/11/17 135/84    Weight from Last 3 Encounters:   05/09/18 199 lb (90.3 kg)   07/25/17 193 lb (87.5 kg)   07/11/17 196 lb (88.9 kg)              Today, you had the following     No orders found for display         Today's Medication Changes          These changes are accurate as of 5/9/18  4:18 PM.  If you have any questions, ask your nurse or doctor.               These medicines have changed or have updated prescriptions.        Dose/Directions    desvenlafaxine succinate 50 MG 24 hr tablet   Commonly known as:  PRISTIQ   This may have changed:  See the new instructions.   Used for:  Major depressive disorder, recurrent episode, mild (H), Anxiety   Changed by:  Cesar De La Torre MD        TAKE 1 TABLET (50 MG) BY MOUTH DAILY   Quantity:  30 tablet   Refills:  3            Where to get your medicines      These medications were sent to [facility]/pharmacy #[id number] - [facility] [address]  [address] [facility] [address]     Phone:  [phone]     desvenlafaxine succinate 50 MG 24 hr tablet                Primary Care Provider Office Phone # Fax #    Curry Raul Sampson PA-C [phone] [address] [facility] [address]        Equal Access to Services     [facility] : Hadii jay clemens hadvictor hugoo Sokayy, waaxda luqadaha, qaybta kaalmada adeegyada, waxay hamlet delgado . So [facility] [phone].    ATENCIÓN: Si habla español, tiene a hood disposición servicios gratuitos de asistencia lingüística. Jhoan al [phone].    We comply with applicable federal civil rights laws and [place] laws. We do not discriminate on the basis of race, color, national origin, age, disability, sex, sexual orientation, or gender identity.            Thank you!     Thank you for choosing [place]  [facility]  for your care. Our goal is always to provide you with excellent care. Hearing back from our patients is one way we can continue to improve our services. Please take a few minutes to complete the written survey that you may receive in the mail after your visit with us. Thank you!             Your Updated Medication List - Protect others around you: Learn how to safely use, store and throw away your medicines at www.disposemymeds.org.          This list is accurate as of 5/9/18  4:18 PM.  Always use your most recent med list.                   Brand Name Dispense Instructions for use Diagnosis    albuterol 108 (90 Base) MCG/ACT Inhaler    PROAIR HFA/PROVENTIL HFA/VENTOLIN HFA    1 Inhaler    Inhale 2 puffs into the lungs every 6 hours as needed for shortness of breath / dyspnea or wheezing        desvenlafaxine succinate 50 MG 24 hr tablet    PRISTIQ    30 tablet    TAKE 1 TABLET (50 MG) BY MOUTH DAILY    Major depressive disorder, recurrent episode, mild (H), Anxiety       EPINEPHrine 0.3 MG/0.3ML injection 2-pack    EPIPEN/ADRENACLICK/or ANY BX GENERIC EQUIV     INJECT 0.3 ML INTRAMUSCULARLY AS NEEDED. MAY REPEAT

## 2018-05-09 NOTE — PROGRESS NOTES
SUBJECTIVE:   Chato Victor is a 22 year old male who presents to clinic today for the following health issues:      Depression Followup    Status since last visit: Improved     See PHQ-9 for current symptoms.  Other associated symptoms: None    Complicating factors:   Significant life event:  No   Current substance abuse:  None  Anxiety or Panic symptoms:  Yes-      PHQ-9 12/29/2016 2/28/2017   Total Score 3 4   Q9: Suicide Ideation Not at all Not at all       PHQ-9  English  PHQ-9   Any Language  Suicide Assessment Five-step Evaluation and Treatment (SAFE-T)    Amount of exercise or physical activity: 2-3 days/week for an average of less than 15 minutes    Problems taking medications regularly: No    Medication side effects: none    Diet: regular (no restrictions)    Inventory of mental status shows issues with anxiety and depression.    Discussed regarding anxiety, sleep, anhedonia, irritability, energy,  perspective, self image, affect, current stressors, holistic parameters, work situation,  physical and social mitigating factors.    Past history has included past  episodes and positiv  family history.    Prior treatment has included ssri and talk therapy is discused  .    Suicidal ideation is  absent.    No problems with vision, hearing, thyroid, chest pain, dyspnea,rash,  stomach upset, polyuria, polydipsia, dysuria,muscle aches, numbness,  cough, tics or balance issues. Memory is reliable .    MARY,thyroid normal, lungs clear, s1s2,soft abdoman, symmetrical dtrs,  no abdominal mass,good peripheral pulses, vs stable.  Discussed ssri vs dop/ser grouping vs wellbutrin in depression mgmt  Discussed anxiolytics as situational tools not specific therapy  (F43.22) Adjustment disorder with anxious mood  (primary encounter diagnosis)  Comment:   Plan:     (F33.0) Major depressive disorder, recurrent episode, mild (H)  Comment:   Plan: desvenlafaxine succinate (PRISTIQ) 50 MG 24 hr         tablet             (F41.9) Anxiety  Comment:   Plan: desvenlafaxine succinate (PRISTIQ) 50 MG 24 hr         tablet        Same meds add CBT

## 2018-05-09 NOTE — TELEPHONE ENCOUNTER
Called the Pt to schedule an office visit, Left message.  Will send in 30 day supply.     Rosa Maria Pride RN -- [facility] Workforce

## 2018-05-10 ASSESSMENT — PATIENT HEALTH QUESTIONNAIRE - PHQ9: SUM OF ALL RESPONSES TO PHQ QUESTIONS 1-9: 2

## 2018-06-05 ENCOUNTER — OFFICE VISIT (OUTPATIENT)
Dept: FAMILY MEDICINE | Facility: CLINIC | Age: 23
End: 2018-06-05
Payer: COMMERCIAL

## 2018-06-05 VITALS
HEART RATE: 50 BPM | RESPIRATION RATE: 12 BRPM | BODY MASS INDEX: 28.57 KG/M2 | WEIGHT: 202 LBS | DIASTOLIC BLOOD PRESSURE: 80 MMHG | OXYGEN SATURATION: 99 % | TEMPERATURE: 98.6 F | SYSTOLIC BLOOD PRESSURE: 124 MMHG

## 2018-06-05 DIAGNOSIS — A08.4 VIRAL GASTROENTERITIS: Primary | ICD-10-CM

## 2018-06-05 PROCEDURE — 99213 OFFICE O/P EST LOW 20 MIN: CPT | Performed by: NURSE PRACTITIONER

## 2018-06-05 RX ORDER — FLUTICASONE PROPIONATE 50 MCG
1 SPRAY, SUSPENSION (ML) NASAL DAILY
COMMUNITY

## 2018-06-05 RX ORDER — ONDANSETRON 4 MG/1
4 TABLET, FILM COATED ORAL EVERY 8 HOURS PRN
Qty: 10 TABLET | Refills: 0 | Status: SHIPPED | OUTPATIENT
Start: 2018-06-05 | End: 2018-09-14

## 2018-06-05 NOTE — LETTER
[facility]  [id number] [place] [id number]  Phone: [phone]    June 5, 2018        Chato Victor  [address] [facility] [address]          To whom it may concern:    RE: Chato Victor    Patient was seen and treated today at our clinic and missed work.    Please contact me for questions or concerns.      Sincerely,        Susan Haase, APRN CNP

## 2018-06-05 NOTE — PATIENT INSTRUCTIONS
Viral Gastroenteritis (Adult)    Gastroenteritis is commonly called the stomach flu. It is most often caused by a virus that affects the stomach and intestinal tract and usually lasts from 2 to 7 days. Common viruses causing gastroenteritis include norovirus, rotavirus, and hepatitis A. Non-viral causes of gastroenteritis include bacteria, parasites, and toxins.  The danger from repeated vomiting or diarrhea is dehydration. This is the loss of too much fluid from the body. When this occurs, body fluids must be replaced. Antibiotics do not help with this illness because it is usually viral.Simple home treatment will be helpful.  Symptoms of viral gastroenteritis may include:    Watery, loose stools    Stomach pain or abdominal cramps    Fever and chills    Nausea and vomiting    Loss of bowel control    Headache  Home care  Gastroenteritis is transmitted by contact with the stool or vomit of an infected person. This can occur from person to person or from contact with a contaminated surface.  Follow these guidelines when caring for yourself at home:    If symptoms are severe, rest at home for the next 24 hours or until you are feeling better.    Wash your hands with soap and water or use alcohol-based  to prevent the spread of infection. Wash your hands after touching anyone who is sick.    Wash your hands or use alcohol-based  after using the toilet and before meals. Clean the toilet after each use.  Remember these tips when preparing food:    People with diarrhea should not prepare or serve food to others. When preparing foods, wash your hands before and after.    Wash your hands after using cutting boards, countertops, knives, or utensils that have been in contact with raw food.    Keep uncooked meats away from cooked and ready-to-eat foods.  Medicine  You may use acetaminophen or NSAID medicines like ibuprofen or naproxen to control fever unless another medicine was given. If you have chronic  liver or kidney disease, talk with your healthcare provider before using these medicines. Also talk with your provider if you've had a stomach ulcer or gastrointestinal bleeding. Don't give aspirin to anyone under 18 years of age who is ill with a fever. It may cause severe liver damage. Don't use NSAIDS is you are already taking one for another condition (like arthritis) or are on aspirin (such as for heart disease or after a stroke).  If medicine for vomiting or diarrhea are prescribed, take these only as directed. Do not take over-the-counter medicines for vomiting or diarrhea unless instructed by your healthcare provider.  Diet  Follow these guidelines for food:    Water and liquids are important so you don't get dehydrated. Drink a small amount at a time or suck on ice chips if you are vomiting.    If you eat, avoid fatty, greasy, spicy, or fried foods.    Don't eat dairy if you have diarrhea. This can make diarrhea worse.    Avoid tobacco, alcohol, and caffeine which may worsen symptoms.  During the first 24 hours (the first full day), follow the diet below:    Beverages. Sports drinks, soft drinks without caffeine, ginger ale, mineral water (plain or flavored), decaffeinated tea and coffee. If you are very dehydrated, sports drinks aren't a good choice. They have too much sugar and not enough electrolytes. In this case, commercially available products called oral rehydration solutions, are best.    Soups. Eat clear broth, consommé, and bouillon.    Desserts. Eat gelatin, popsicles, and fruit juice bars.  During the next 24 hours (the second day), you may add the following to the above:    Hot cereal, plain toast, bread, rolls, and crackers    Plain noodles, rice, mashed potatoes, chicken noodle or rice soup    Unsweetened canned fruit (avoid pineapple), bananas    Limit fat intake to less than 15 grams per day. Do this by avoiding margarine, butter, oils, mayonnaise, sauces, gravies, fried foods, peanut  butter, meat, poultry, and fish.    Limit fiber and avoid raw or cooked vegetables, fresh fruits (except bananas), and bran cereals.    Limit caffeine and chocolate. Don't use spices or seasonings other than salt.    Limit dairy products.    Avoid alcohol.  During the next 24 hours:    Gradually resume a normal diet as you feel better and your symptoms improve.    If at any time it starts getting worse again, go back to clear liquids until you feel better.  Follow-up care  Follow up with your healthcare provider, or as advised. Call your provider if you don't get better within 24 hours or if diarrhea lasts more than a week. Also follow up if you are unable to keep down liquids and get dehydrated. If a stool (diarrhea) sample was taken, call as directed for the results.  Call 911  Call 911 if any of these occur:    Trouble breathing    Chest pain    Confused    Severe drowsiness or trouble awakening    Fainting or loss of consciousness    Rapid heart rate    Seizure    Stiff neck  When to seek medical advice  Call your healthcare provider right away if any of these occur:    Abdominal pain that gets worse    Continued vomiting (unable to keep liquids down)    Frequent diarrhea (more than 5 times a day)    Blood in vomit or stool (black or red color)    Dark urine, reduced urine output, or extreme thirst    Weakness or dizziness    Drowsiness    Fever of 100.4 F (38 C) or higher, or as directed by your healthcare provider    New rash  Date Last Reviewed: 1/3/2016    [id number] [employer]. [address] [place] [address], [place] [address]. All rights reserved. This information is not intended as a substitute for professional medical care. Always follow your healthcare professional's instructions.

## 2018-06-05 NOTE — MR AVS SNAPSHOT
After Visit Summary   6/5/2018    Chato Victor    MRN: [id number]           Patient Information     Date Of Birth          1995        Visit Information        Provider Department      6/5/2018 8:45 AM Haase, Susan Rachele, APRN [facility]        Today's Diagnoses     Viral gastroenteritis    -  1      Care Instructions      Viral Gastroenteritis (Adult)    Gastroenteritis is commonly called the stomach flu. It is most often caused by a virus that affects the stomach and intestinal tract and usually lasts from 2 to 7 days. Common viruses causing gastroenteritis include norovirus, rotavirus, and hepatitis A. Non-viral causes of gastroenteritis include bacteria, parasites, and toxins.  The danger from repeated vomiting or diarrhea is dehydration. This is the loss of too much fluid from the body. When this occurs, body fluids must be replaced. Antibiotics do not help with this illness because it is usually viral.Simple home treatment will be helpful.  Symptoms of viral gastroenteritis may include:    Watery, loose stools    Stomach pain or abdominal cramps    Fever and chills    Nausea and vomiting    Loss of bowel control    Headache  Home care  Gastroenteritis is transmitted by contact with the stool or vomit of an infected person. This can occur from person to person or from contact with a contaminated surface.  Follow these guidelines when caring for yourself at home:    If symptoms are severe, rest at home for the next 24 hours or until you are feeling better.    Wash your hands with soap and water or use alcohol-based  to prevent the spread of infection. Wash your hands after touching anyone who is sick.    Wash your hands or use alcohol-based  after using the toilet and before meals. Clean the toilet after each use.  Remember these tips when preparing food:    People with diarrhea should not prepare or serve food to others. When preparing foods, wash  your hands before and after.    Wash your hands after using cutting boards, countertops, knives, or utensils that have been in contact with raw food.    Keep uncooked meats away from cooked and ready-to-eat foods.  Medicine  You may use acetaminophen or NSAID medicines like ibuprofen or naproxen to control fever unless another medicine was given. If you have chronic liver or kidney disease, talk with your healthcare provider before using these medicines. Also talk with your provider if you've had a stomach ulcer or gastrointestinal bleeding. Don't give aspirin to anyone under 18 years of age who is ill with a fever. It may cause severe liver damage. Don't use NSAIDS is you are already taking one for another condition (like arthritis) or are on aspirin (such as for heart disease or after a stroke).  If medicine for vomiting or diarrhea are prescribed, take these only as directed. Do not take over-the-counter medicines for vomiting or diarrhea unless instructed by your healthcare provider.  Diet  Follow these guidelines for food:    Water and liquids are important so you don't get dehydrated. Drink a small amount at a time or suck on ice chips if you are vomiting.    If you eat, avoid fatty, greasy, spicy, or fried foods.    Don't eat dairy if you have diarrhea. This can make diarrhea worse.    Avoid tobacco, alcohol, and caffeine which may worsen symptoms.  During the first 24 hours (the first full day), follow the diet below:    Beverages. Sports drinks, soft drinks without caffeine, ginger ale, mineral water (plain or flavored), decaffeinated tea and coffee. If you are very dehydrated, sports drinks aren't a good choice. They have too much sugar and not enough electrolytes. In this case, commercially available products called oral rehydration solutions, are best.    Soups. Eat clear broth, consommé, and bouillon.    Desserts. Eat gelatin, popsicles, and fruit juice bars.  During the next 24 hours (the second day),  you may add the following to the above:    Hot cereal, plain toast, bread, rolls, and crackers    Plain noodles, rice, mashed potatoes, chicken noodle or rice soup    Unsweetened canned fruit (avoid pineapple), bananas    Limit fat intake to less than 15 grams per day. Do this by avoiding margarine, butter, oils, mayonnaise, sauces, gravies, fried foods, peanut butter, meat, poultry, and fish.    Limit fiber and avoid raw or cooked vegetables, fresh fruits (except bananas), and bran cereals.    Limit caffeine and chocolate. Don't use spices or seasonings other than salt.    Limit dairy products.    Avoid alcohol.  During the next 24 hours:    Gradually resume a normal diet as you feel better and your symptoms improve.    If at any time it starts getting worse again, go back to clear liquids until you feel better.  Follow-up care  Follow up with your healthcare provider, or as advised. Call your provider if you don't get better within 24 hours or if diarrhea lasts more than a week. Also follow up if you are unable to keep down liquids and get dehydrated. If a stool (diarrhea) sample was taken, call as directed for the results.  Call 911  Call 911 if any of these occur:    Trouble breathing    Chest pain    Confused    Severe drowsiness or trouble awakening    Fainting or loss of consciousness    Rapid heart rate    Seizure    Stiff neck  When to seek medical advice  Call your healthcare provider right away if any of these occur:    Abdominal pain that gets worse    Continued vomiting (unable to keep liquids down)    Frequent diarrhea (more than 5 times a day)    Blood in vomit or stool (black or red color)    Dark urine, reduced urine output, or extreme thirst    Weakness or dizziness    Drowsiness    Fever of 100.4 F (38 C) or higher, or as directed by your healthcare provider    New rash  Date Last Reviewed: 1/3/2016    [id number] [employer]. [address] [place] [address], [place] [address]. All rights  reserved. This information is not intended as a substitute for professional medical care. Always follow your healthcare professional's instructions.                Follow-ups after your visit        Follow-up notes from your care team     Return in about 2 days (around 6/7/2018).      Who to contact     If you have questions or need follow up information about today's clinic visit or your schedule please contact [facility] directly at [phone].  Normal or non-critical lab and imaging results will be communicated to you by [employer], letter or phone within 4 business days after the clinic has received the results. If you do not hear from us within 7 days, please contact the clinic through [employer] or phone. If you have a critical or abnormal lab result, we will notify you by phone as soon as possible.  Submit refill requests through [employer] or call your pharmacy and they will forward the refill request to us. Please allow 3 business days for your refill to be completed.          Additional Information About Your Visit        [employer] Information     [employer] gives you secure access to your electronic health record. If you see a primary care provider, you can also send messages to your care team and make appointments. If you have questions, please call your primary care clinic.  If you do not have a primary care provider, please call [phone] and they will assist you.        Care EveryWhere ID     This is your Care EveryWhere ID. This could be used by other organizations to access your [facility] medical records  [id number]        Your Vitals Were     Pulse Temperature Respirations Pulse Oximetry BMI (Body Mass Index)       50 98.6  F (37  C) (Oral) 12 99% 28.57 kg/m2        Blood Pressure from Last 3 Encounters:   06/05/18 124/80   05/09/18 131/80   07/25/17 139/72    Weight from Last 3 Encounters:   06/05/18 202 lb (91.6 kg)   05/09/18 199 lb (90.3 kg)   07/25/17 193 lb (87.5 kg)               We Performed the Following     DEPRESSION ACTION PLAN (DAP)          Today's Medication Changes          These changes are accurate as of 6/5/18  8:57 AM.  If you have any questions, ask your nurse or doctor.               Start taking these medicines.        Dose/Directions    ondansetron 4 MG tablet   Commonly known as:  ZOFRAN   Used for:  Viral gastroenteritis   Started by:  Haase, Susan Rachele, APRN CNP        Dose:  4 mg   Take 1 tablet (4 mg) by mouth every 8 hours as needed for nausea   Quantity:  10 tablet   Refills:  0            Where to get your medicines      These medications were sent to [facility]/pharmacy #[id number] - [place], [place] - [address]  [address] [facility] [address]     Phone:  [phone]     ondansetron 4 MG tablet                Primary Care Provider Office Phone # Fax #    Curry Raul Sampson PA-C [phone] [address] [facility] [facility] [address]        Equal Access to Services     KEVIN SANCHEZ : Hadii jay clemens hadasho Soomaali, waaxda luqadaha, qaybta kaalmada adeegyada, abhay mcnulty haynacho delgado . So [facility] [phone].    ATENCIÓN: Si habla español, tiene a hood disposición servicios gratuitos de asistencia lingüística. Jhoan al [phone].    We comply with applicable federal civil rights laws and [place] laws. We do not discriminate on the basis of race, color, national origin, age, disability, sex, sexual orientation, or gender identity.            Thank you!     Thank you for choosing [facility]  for your care. Our goal is always to provide you with excellent care. Hearing back from our patients is one way we can continue to improve our services. Please take a few minutes to complete the written survey that you may receive in the mail after your visit with us. Thank you!             Your Updated Medication List - Protect others around you: Learn how to safely use, store and throw away your medicines at  www.disposemymeds.org.          This list is accurate as of 6/5/18  8:57 AM.  Always use your most recent med list.                   Brand Name Dispense Instructions for use Diagnosis    albuterol 108 (90 Base) MCG/ACT Inhaler    PROAIR HFA/PROVENTIL HFA/VENTOLIN HFA    1 Inhaler    Inhale 2 puffs into the lungs every 6 hours as needed for shortness of breath / dyspnea or wheezing        desvenlafaxine succinate 50 MG 24 hr tablet    PRISTIQ    30 tablet    TAKE 1 TABLET (50 MG) BY MOUTH DAILY    Major depressive disorder, recurrent episode, mild (H), Anxiety       EPINEPHrine 0.3 MG/0.3ML injection 2-pack    EPIPEN/ADRENACLICK/or ANY BX GENERIC EQUIV     INJECT 0.3 ML INTRAMUSCULARLY AS NEEDED. MAY REPEAT        fluticasone 50 MCG/ACT spray    FLONASE     Spray 1 spray into both nostrils daily        ondansetron 4 MG tablet    ZOFRAN    10 tablet    Take 1 tablet (4 mg) by mouth every 8 hours as needed for nausea    Viral gastroenteritis

## 2018-06-05 NOTE — PROGRESS NOTES
SUBJECTIVE:   Chato Victor is a 22 year old male who presents to clinic today for the following health issues:    Abdominal Pain      Duration: 1 day    Description (location/character/radiation): middle       Associated flank pain: None    Intensity:  moderate    Accompanying signs and symptoms:        Fever/Chills: YES, chills       Gas/Bloating: no        Nausea/vomiting: YES       Diarrhea: YES       Dysuria or Hematuria: no     History (previous similar pain/trauma/previous testing): none    Precipitating or alleviating factors:       Pain worse with eating/BM/urination: no       Pain relieved by BM: no     Therapies tried and outcome: None    LMP:  not applicable    Tobi reports that last night he started having abdominal pain, and when he woke up this morning he vomited. He currently feels nauseous and has stomach cramps. His last bowel movement was last night, and was diarrhea. For dinner last night he ordered a pizza, and he had a glass of water to drink this morning. Denies blood in stool, fever, previous abdominal surgery or recent antibiotics.       Problem list and histories reviewed & adjusted, as indicated.  Additional history: as documented    Reviewed and updated as needed this visit by clinical staff  Tobacco  Allergies  Meds  Med Hx  Surg Hx  Fam Hx  Soc Hx      Reviewed and updated as needed this visit by Provider       ROS:  Constitutional, cardiovascular, pulmonary, GI and psych systems are negative, except as otherwise noted.    This document serves as a record of the services and decisions personally performed and made by Susan Haase, CNP. It was created on her behalf by Kp Blake, a trained medical scribe. The creation of this document is based the provider's statements to the medical scribe.  Kp Blake June 5, 2018 8:46 AM      OBJECTIVE:   /80 (BP Location: Left arm, Patient Position: Chair, Cuff Size: Adult Regular)  Pulse 50  Temp 98.6  F (37  C) (Oral)  Resp 12   Wt 91.6 kg (202 lb)  SpO2 99%  BMI 28.57 kg/m2  Body mass index is 28.57 kg/(m^2).  GENERAL: healthy, alert and no distress  RESP: lungs clear to auscultation - no rales, rhonchi or wheezes  CV: regular rate and rhythm, normal S1 S2, no S3 or S4, no murmur, click or rub, no peripheral edema and peripheral pulses strong  ABDOMEN: soft, LLQ tenderness,  no hepatosplenomegaly, no masses and bowel sounds normal, no RLQ or rebound tenderness.  PSYCH: mentation appears normal, affect normal/bright    ASSESSMENT/PLAN:   Chato was seen today for abdominal pain.    Diagnoses and all orders for this visit:    Viral gastroenteritis: discussed with symptoms of diarrhea and vomiting likely viral in nature, encouraged electrolyte fluid (Gatorade), bland foods, also will prescribe zofran for nausea.    -     ondansetron (ZOFRAN) 4 MG tablet; Take 1 tablet (4 mg) by mouth every 8 hours as needed for nausea    Other orders  -     DEPRESSION ACTION PLAN (DAP)  Follow up in 2 days if symptoms do not improve, sooner if develops acute abdominal pain or unrelenting vomiting.     The information in this document, created by the medical scribe for me, accurately reflects the services I personally performed and the decisions made by me. I have reviewed and approved this document for accuracy.   Susan Haase, CNP Susan Haase, APRN [facility]

## 2018-06-08 DIAGNOSIS — F41.9 ANXIETY: ICD-10-CM

## 2018-06-08 DIAGNOSIS — F33.0 MAJOR DEPRESSIVE DISORDER, RECURRENT EPISODE, MILD (H): ICD-10-CM

## 2018-06-08 NOTE — TELEPHONE ENCOUNTER
"Requested Prescriptions   Pending Prescriptions Disp Refills     desvenlafaxine succinate (PRISTIQ) 50 MG 24 hr tablet [Pharmacy Med Name: DESVENLAFAXINE SUC ER 50 MG TB] 30 tablet 0    Last Written Prescription Date:  5/9/18  Last Fill Quantity: 30,  # refills: 3   Last Office Visit: 6/5/2018   Future Office Visit:      Sig: TAKE 1 TABLET BY MOUTH EVERY DAY **OFFICE VISIT NEEDED FOR ANY FURTHER FILLS**    Serotonin-Norepinephrine Reuptake Inhibitors  Failed    6/8/2018  3:34 AM       Failed - Normal serum creatinine on file in past 12 months    No lab results found.         Passed - Blood pressure under 140/90 in past 12 months    BP Readings from Last 3 Encounters:   06/05/18 124/80   05/09/18 131/80   07/25/17 139/72                Passed - PHQ-9 score of less than 5 in past 6 months    Please review last PHQ-9 score.          Passed - Patient is age 18 or older       Passed - Recent (6 mo) or future (30 days) visit within the authorizing provider's specialty    Patient had office visit in the last 6 months or has a visit in the next 30 days with authorizing provider or within the authorizing provider's specialty.  See \"Patient Info\" tab in inbasket, or \"Choose Columns\" in Meds & Orders section of the refill encounter.              "

## 2018-06-11 RX ORDER — DESVENLAFAXINE 50 MG/1
50 TABLET, FILM COATED, EXTENDED RELEASE ORAL DAILY
Qty: 90 TABLET | Refills: 0 | Status: SHIPPED | OUTPATIENT
Start: 2018-06-11 | End: 2018-09-14

## 2018-06-11 NOTE — TELEPHONE ENCOUNTER
PHQ-9 SCORE 12/29/2016 2/28/2017 5/9/2018   Total Score 3 4 2     INDIGO-7 SCORE 12/29/2016 2/28/2017   Total Score 3 7     Prescription approved per [facility] Refill Protocol.  Ye Ramirez RN, BSN  Instructions        Return in about 3 months (around 8/9/2018).

## 2018-09-14 ENCOUNTER — OFFICE VISIT (OUTPATIENT)
Dept: FAMILY MEDICINE | Facility: CLINIC | Age: 23
End: 2018-09-14
Payer: COMMERCIAL

## 2018-09-14 VITALS
BODY MASS INDEX: 28.43 KG/M2 | TEMPERATURE: 98 F | HEART RATE: 60 BPM | SYSTOLIC BLOOD PRESSURE: 138 MMHG | WEIGHT: 201 LBS | DIASTOLIC BLOOD PRESSURE: 82 MMHG

## 2018-09-14 DIAGNOSIS — K21.9 GASTROESOPHAGEAL REFLUX DISEASE, ESOPHAGITIS PRESENCE NOT SPECIFIED: ICD-10-CM

## 2018-09-14 DIAGNOSIS — F41.9 ANXIETY: ICD-10-CM

## 2018-09-14 DIAGNOSIS — F33.0 MAJOR DEPRESSIVE DISORDER, RECURRENT EPISODE, MILD (H): ICD-10-CM

## 2018-09-14 DIAGNOSIS — Z00.00 ROUTINE GENERAL MEDICAL EXAMINATION AT A HEALTH CARE FACILITY: Primary | ICD-10-CM

## 2018-09-14 DIAGNOSIS — R09.81 NASAL CONGESTION: ICD-10-CM

## 2018-09-14 LAB
CHOLEST SERPL-MCNC: 179 MG/DL
ERYTHROCYTE [DISTWIDTH] IN BLOOD BY AUTOMATED COUNT: 12.3 % (ref 10–15)
GLUCOSE SERPL-MCNC: 98 MG/DL (ref 70–99)
HCT VFR BLD AUTO: 45.8 % (ref 40–53)
HDLC SERPL-MCNC: 55 MG/DL
HGB BLD-MCNC: 16.1 G/DL (ref 13.3–17.7)
LDLC SERPL CALC-MCNC: 98 MG/DL
MCH RBC QN AUTO: 30.8 PG (ref 26.5–33)
MCHC RBC AUTO-ENTMCNC: 35.2 G/DL (ref 31.5–36.5)
MCV RBC AUTO: 88 FL (ref 78–100)
NONHDLC SERPL-MCNC: 124 MG/DL
PLATELET # BLD AUTO: 267 10E9/L (ref 150–450)
RBC # BLD AUTO: 5.22 10E12/L (ref 4.4–5.9)
TRIGL SERPL-MCNC: 132 MG/DL
WBC # BLD AUTO: 6.8 10E9/L (ref 4–11)

## 2018-09-14 PROCEDURE — 99395 PREV VISIT EST AGE 18-39: CPT | Performed by: PHYSICIAN ASSISTANT

## 2018-09-14 PROCEDURE — 36415 COLL VENOUS BLD VENIPUNCTURE: CPT | Performed by: PHYSICIAN ASSISTANT

## 2018-09-14 PROCEDURE — 85027 COMPLETE CBC AUTOMATED: CPT | Performed by: PHYSICIAN ASSISTANT

## 2018-09-14 PROCEDURE — 82947 ASSAY GLUCOSE BLOOD QUANT: CPT | Performed by: PHYSICIAN ASSISTANT

## 2018-09-14 PROCEDURE — 80061 LIPID PANEL: CPT | Performed by: PHYSICIAN ASSISTANT

## 2018-09-14 PROCEDURE — 99213 OFFICE O/P EST LOW 20 MIN: CPT | Mod: 25 | Performed by: PHYSICIAN ASSISTANT

## 2018-09-14 RX ORDER — DESVENLAFAXINE 50 MG/1
TABLET, FILM COATED, EXTENDED RELEASE ORAL
Qty: 90 TABLET | Refills: 3 | Status: SHIPPED | OUTPATIENT
Start: 2018-09-14 | End: 2019-08-12 | Stop reason: ALTCHOICE

## 2018-09-14 ASSESSMENT — ANXIETY QUESTIONNAIRES
GAD7 TOTAL SCORE: 0
GAD7 TOTAL SCORE: 0
5. BEING SO RESTLESS THAT IT IS HARD TO SIT STILL: NOT AT ALL
7. FEELING AFRAID AS IF SOMETHING AWFUL MIGHT HAPPEN: NOT AT ALL
4. TROUBLE RELAXING: NOT AT ALL
GAD7 TOTAL SCORE: 0
1. FEELING NERVOUS, ANXIOUS, OR ON EDGE: NOT AT ALL
3. WORRYING TOO MUCH ABOUT DIFFERENT THINGS: NOT AT ALL
2. NOT BEING ABLE TO STOP OR CONTROL WORRYING: NOT AT ALL
6. BECOMING EASILY ANNOYED OR IRRITABLE: NOT AT ALL
7. FEELING AFRAID AS IF SOMETHING AWFUL MIGHT HAPPEN: NOT AT ALL

## 2018-09-14 ASSESSMENT — PATIENT HEALTH QUESTIONNAIRE - PHQ9
SUM OF ALL RESPONSES TO PHQ QUESTIONS 1-9: 0
10. IF YOU CHECKED OFF ANY PROBLEMS, HOW DIFFICULT HAVE THESE PROBLEMS MADE IT FOR YOU TO DO YOUR WORK, TAKE CARE OF THINGS AT HOME, OR GET ALONG WITH OTHER PEOPLE: NOT DIFFICULT AT ALL
SUM OF ALL RESPONSES TO PHQ QUESTIONS 1-9: 0

## 2018-09-14 NOTE — PATIENT INSTRUCTIONS
Preventive Health Recommendations  Male Ages 21 - 25     Yearly exam:             See your health care provider every year in order to  o   Review health changes.   o   Discuss preventive care.    o   Review your medicines if your doctor has prescribed any.    You should be tested each year for STDs (sexually transmitted diseases).     Talk to your provider about cholesterol testing.      If you are at risk for diabetes, you should have a diabetes test (fasting glucose).    Shots: Get a flu shot each year. Get a tetanus shot every 10 years.     Nutrition:    Eat at least 5 servings of fruits and vegetables daily.     Eat whole-grain bread, whole-wheat pasta and brown rice instead of white grains and rice.     Get adequate calcium and Vitamin D.     Lifestyle    Exercise for at least 150 minutes a week (30 minutes a day, 5 days a week). This will help you control your weight and prevent disease.     Limit alcohol to one drink per day.     No smoking.     Wear sunscreen to prevent skin cancer.     See your dentist every six months for an exam and cleaning.     Discharge Instructions for Gastroesophageal Reflux Disease (GERD)  Gastroesophageal reflux disease (GERD) is a backflow of acid from the stomach into the swallowing tube (esophagus).  Home care  These home care steps can help you manage GERD:    Maintain a healthy weight. Get help to lose any extra pounds.    Avoid lying down after meals.    Avoid eating late at night.    Elevate the head of your bed by 6 inches. You can do this by placing wooden blocks or bed risers under the head of your bed.    Avoid wearing tight-fitting clothes.    Avoid foods that might irritate your stomach, such as the following:  ? Alcohol  ? Fat  ? Chocolate  ? Caffeine  ? Spearmint or peppermint    Talk to your healthcare provider if you are taking any of the following medicines. These medicines can make GERD symptoms worse:  ? Calcium channel  blockers  ? Theophylline  ? Anticholinergic medicines, such as oxybutynin and benzatropine    Begin an exercise program. Ask your healthcare provider how to get started. You can benefit from simple activities, such as walking or gardening.    Break the smoking habit. Enroll in a stop-smoking program to improve your chances of success.    Limit alcohol intake to no more than 2 drinks a day.    Take your medicines exactly as directed. Don t skip doses.    Avoid over-the-counter nonsteroidal anti-inflammatory medicines, such as aspirin and ibuprofen, unless recommended by your healthcare provider for certain conditions.     If possible, avoid nitrates (heart medicines, such as nitroglycerin and isosorbide dinitrate ).  Follow-up care  Make a follow-up appointment as directed by our staff.     When to call the healthcare provider  Call your healthcare provider immediately if you have any of the following:    Trouble swallowing    Pain when swallowing    Feeling of food caught in your chest or throat    Pain in the neck, chest, or back    Heartburn that causes you to vomit    Vomiting blood    Black or tarry stools (from digested blood)    More saliva (watering of the mouth) than usual    Weight loss of more than 3% to 5% of your total body weight in a month    Hoarseness or sore throat that won t go away    Choking, coughing, or wheezing   Date Last Reviewed: 7/1/2016 2000-2017 [employer]. [address] [place] [address], [place] [address]. All rights reserved. This information is not intended as a substitute for professional medical care. Always follow your healthcare professional's instructions.        H. Pylori Infection with Peptic Ulcer    A peptic ulcer is an open sore in the lining of the stomach. It may also form in the lining of the first part of the small intestine (duodenum). Symptoms of a peptic ulcer include stomach pain and upset. Nausea, vomiting, bloating, or bleeding may sometimes occur. In  many cases, bacteria called H. pylori are thought to be involved in the development of a peptic ulcer.  Many people have H. pylori in their bodies. Most of the time, it causes no problems. In some people, though, the H. pylori infection causes irritation of the stomach lining. This may make the lining more likely to be damaged by normal stomach acids. H. pylori may also increase the amount of acid in the stomach. It is not clear why this infection leads to problems in some people and not in others.  Tests may be done to check for H. pylori infection. These include a blood test, a breath test, and a stool test. In some cases, a test called endoscopy may be done. During this test, a thin, lighted tube is put into the mouth and down the throat. The healthcare provider can look at the esophagus, stomach, and duodenum through this tube. During this test, a tiny sample of stomach lining (biopsy) may be taken and tested for H. pylori.  Home care    Medicines are used to treat H. pylori infection. Two or more medicines are usually taken together.     Take all prescribed medicines as directed. Take all of the medicines until they are gone or you are told to stop. This is very important. If you do not finish the medicines, the infection may remain and may be harder to treat.    Ask your healthcare provider what side effects the medicines might cause. These can include stomach cramps, diarrhea, or constipation.    After the medicine is finished, you may have another test to see if H. pylori infection is still present.    Avoid alcohol during treatment.  Follow-up care  Follow up with your healthcare provider as directed. Be sure to return to be retested for H. pylori after treatment.  When to seek medical advice  Call your healthcare provider for any of the following:    Stomach pain that worsens or moves to the right lower part of the abdomen    Chest pain appears or worsens, or spreads to the back, neck, shoulder, or  arm    Vomiting    Blood in stool or vomit    Feeling weak or dizzy  Date Last Reviewed: 6/24/2015 2000-2017 [employer]. [address] [facility], [place] [address]. All rights reserved. This information is not intended as a substitute for professional medical care. Always follow your healthcare professional's instructions.
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
## 2018-09-14 NOTE — MR AVS SNAPSHOT
After Visit Summary   9/14/2018    Chato Victor    MRN: [id number]           Patient Information     Date Of Birth          1995        Visit Information        Provider Department      9/14/2018 7:00 AM Curry Sampson PA-C [facility]        Today's Diagnoses     Routine general medical examination at a health care facility    -  1    Major depressive disorder, recurrent episode, mild (H)        Anxiety        Gastroesophageal reflux disease, esophagitis presence not specified          Care Instructions      Preventive Health Recommendations  Male Ages 21 - 25     Yearly exam:             See your health care provider every year in order to  o   Review health changes.   o   Discuss preventive care.    o   Review your medicines if your doctor has prescribed any.    You should be tested each year for STDs (sexually transmitted diseases).     Talk to your provider about cholesterol testing.      If you are at risk for diabetes, you should have a diabetes test (fasting glucose).    Shots: Get a flu shot each year. Get a tetanus shot every 10 years.     Nutrition:    Eat at least 5 servings of fruits and vegetables daily.     Eat whole-grain bread, whole-wheat pasta and brown rice instead of white grains and rice.     Get adequate calcium and Vitamin D.     Lifestyle    Exercise for at least 150 minutes a week (30 minutes a day, 5 days a week). This will help you control your weight and prevent disease.     Limit alcohol to one drink per day.     No smoking.     Wear sunscreen to prevent skin cancer.     See your dentist every six months for an exam and cleaning.     Discharge Instructions for Gastroesophageal Reflux Disease (GERD)  Gastroesophageal reflux disease (GERD) is a backflow of acid from the stomach into the swallowing tube (esophagus).  Home care  These home care steps can help you manage GERD:    Maintain a healthy weight. Get help to lose any extra  pounds.    Avoid lying down after meals.    Avoid eating late at night.    Elevate the head of your bed by 6 inches. You can do this by placing wooden blocks or bed risers under the head of your bed.    Avoid wearing tight-fitting clothes.    Avoid foods that might irritate your stomach, such as the following:  ? Alcohol  ? Fat  ? Chocolate  ? Caffeine  ? Spearmint or peppermint    Talk to your healthcare provider if you are taking any of the following medicines. These medicines can make GERD symptoms worse:  ? Calcium channel blockers  ? Theophylline  ? Anticholinergic medicines, such as oxybutynin and benzatropine    Begin an exercise program. Ask your healthcare provider how to get started. You can benefit from simple activities, such as walking or gardening.    Break the smoking habit. Enroll in a stop-smoking program to improve your chances of success.    Limit alcohol intake to no more than 2 drinks a day.    Take your medicines exactly as directed. Don t skip doses.    Avoid over-the-counter nonsteroidal anti-inflammatory medicines, such as aspirin and ibuprofen, unless recommended by your healthcare provider for certain conditions.     If possible, avoid nitrates (heart medicines, such as nitroglycerin and isosorbide dinitrate ).  Follow-up care  Make a follow-up appointment as directed by our staff.     When to call the healthcare provider  Call your healthcare provider immediately if you have any of the following:    Trouble swallowing    Pain when swallowing    Feeling of food caught in your chest or throat    Pain in the neck, chest, or back    Heartburn that causes you to vomit    Vomiting blood    Black or tarry stools (from digested blood)    More saliva (watering of the mouth) than usual    Weight loss of more than 3% to 5% of your total body weight in a month    Hoarseness or sore throat that won t go away    Choking, coughing, or wheezing   Date Last Reviewed: 7/1/2016 2000-2017 [facility]  [employer]. [address] [place] [address]. All rights reserved. This information is not intended as a substitute for professional medical care. Always follow your healthcare professional's instructions.        H. Pylori Infection with Peptic Ulcer    A peptic ulcer is an open sore in the lining of the stomach. It may also form in the lining of the first part of the small intestine (duodenum). Symptoms of a peptic ulcer include stomach pain and upset. Nausea, vomiting, bloating, or bleeding may sometimes occur. In many cases, bacteria called H. pylori are thought to be involved in the development of a peptic ulcer.  Many people have H. pylori in their bodies. Most of the time, it causes no problems. In some people, though, the H. pylori infection causes irritation of the stomach lining. This may make the lining more likely to be damaged by normal stomach acids. H. pylori may also increase the amount of acid in the stomach. It is not clear why this infection leads to problems in some people and not in others.  Tests may be done to check for H. pylori infection. These include a blood test, a breath test, and a stool test. In some cases, a test called endoscopy may be done. During this test, a thin, lighted tube is put into the mouth and down the throat. The healthcare provider can look at the esophagus, stomach, and duodenum through this tube. During this test, a tiny sample of stomach lining (biopsy) may be taken and tested for H. pylori.  Home care    Medicines are used to treat H. pylori infection. Two or more medicines are usually taken together.     Take all prescribed medicines as directed. Take all of the medicines until they are gone or you are told to stop. This is very important. If you do not finish the medicines, the infection may remain and may be harder to treat.    Ask your healthcare provider what side effects the medicines might cause. These can include stomach cramps, diarrhea, or  constipation.    After the medicine is finished, you may have another test to see if H. pylori infection is still present.    Avoid alcohol during treatment.  Follow-up care  Follow up with your healthcare provider as directed. Be sure to return to be retested for H. pylori after treatment.  When to seek medical advice  Call your healthcare provider for any of the following:    Stomach pain that worsens or moves to the right lower part of the abdomen    Chest pain appears or worsens, or spreads to the back, neck, shoulder, or arm    Vomiting    Blood in stool or vomit    Feeling weak or dizzy  Date Last Reviewed: 6/24/2015 2000-2017 [employer]. [address] [place] [address]. All rights reserved. This information is not intended as a substitute for professional medical care. Always follow your healthcare professional's instructions.                Follow-ups after your visit        Future tests that were ordered for you today     Open Future Orders        Priority Expected Expires Ordered    H Pylori antigen stool Routine  10/14/2018 9/14/2018            Who to contact     If you have questions or need follow up information about today's clinic visit or your schedule please contact [facility] directly at [phone].  Normal or non-critical lab and imaging results will be communicated to you by MyChart, letter or phone within 4 business days after the clinic has received the results. If you do not hear from us within 7 days, please contact the clinic through [employer] or phone. If you have a critical or abnormal lab result, we will notify you by phone as soon as possible.  Submit refill requests through [employer] or call your pharmacy and they will forward the refill request to us. Please allow 3 business days for your refill to be completed.          Additional Information About Your Visit        [employer] Information     [employer] gives you secure access to your electronic  health record. If you see a primary care provider, you can also send messages to your care team and make appointments. If you have questions, please call your primary care clinic.  If you do not have a primary care provider, please call [phone] and they will assist you.        Care EveryWhere ID     This is your Care EveryWhere ID. This could be used by other organizations to access your [facility] medical records  [id number]        Your Vitals Were     Pulse Temperature BMI (Body Mass Index)             60 98  F (36.7  C) (Oral) 28.43 kg/m2          Blood Pressure from Last 3 Encounters:   09/14/18 138/82   06/05/18 124/80   05/09/18 131/80    Weight from Last 3 Encounters:   09/14/18 201 lb (91.2 kg)   06/05/18 202 lb (91.6 kg)   05/09/18 199 lb (90.3 kg)              We Performed the Following     CBC with platelets     Glucose     Lipid panel reflex to direct LDL Fasting          Today's Medication Changes          These changes are accurate as of 9/14/18  7:50 AM.  If you have any questions, ask your nurse or doctor.               These medicines have changed or have updated prescriptions.        Dose/Directions    desvenlafaxine succinate 50 MG 24 hr tablet   Commonly known as:  PRISTIQ   This may have changed:  Another medication with the same name was removed. Continue taking this medication, and follow the directions you see here.   Used for:  Major depressive disorder, recurrent episode, mild (H), Anxiety   Changed by:  Curry Sampson PA-C        TAKE 1 TABLET (50 MG) BY MOUTH DAILY   Quantity:  90 tablet   Refills:  3            Where to get your medicines      These medications were sent to [id number] - [facility] [address]  [address] [address] [facility] [address]     Phone:  [phone]     desvenlafaxine succinate 50 MG 24 hr tablet                Primary Care Provider Office Phone # Fax #    Curry Sampson PA-C [phone] [address]  [facility] [address]        Equal Access to Services     [facility] LAURA : Hadii jay clemens hadvictor hugoaliya Sokayy, waaxda luqadaha, qaybta kaalmafrancisco cerrato, abhay gonzales. So [facility] [phone].    ATENCIÓN: Si habla español, tiene a hood disposición servicios gratuitos de asistencia lingüística. Alfredoame al [phone].    We comply with applicable federal civil rights laws and [place] laws. We do not discriminate on the basis of race, color, national origin, age, disability, sex, sexual orientation, or gender identity.            Thank you!     Thank you for choosing [facility]  for your care. Our goal is always to provide you with excellent care. Hearing back from our patients is one way we can continue to improve our services. Please take a few minutes to complete the written survey that you may receive in the mail after your visit with us. Thank you!             Your Updated Medication List - Protect others around you: Learn how to safely use, store and throw away your medicines at www.disposemymeds.org.          This list is accurate as of 9/14/18  7:50 AM.  Always use your most recent med list.                   Brand Name Dispense Instructions for use Diagnosis    albuterol 108 (90 Base) MCG/ACT inhaler    PROAIR HFA/PROVENTIL HFA/VENTOLIN HFA    1 Inhaler    Inhale 2 puffs into the lungs every 6 hours as needed for shortness of breath / dyspnea or wheezing        desvenlafaxine succinate 50 MG 24 hr tablet    PRISTIQ    90 tablet    TAKE 1 TABLET (50 MG) BY MOUTH DAILY    Major depressive disorder, recurrent episode, mild (H), Anxiety       EPINEPHrine 0.3 MG/0.3ML injection 2-pack    EPIPEN/ADRENACLICK/or ANY BX GENERIC EQUIV     INJECT 0.3 ML INTRAMUSCULARLY AS NEEDED. MAY REPEAT        fluticasone 50 MCG/ACT spray    FLONASE     Spray 1 spray into both nostrils daily

## 2018-09-15 ASSESSMENT — ANXIETY QUESTIONNAIRES: GAD7 TOTAL SCORE: 0

## 2018-09-15 ASSESSMENT — PATIENT HEALTH QUESTIONNAIRE - PHQ9: SUM OF ALL RESPONSES TO PHQ QUESTIONS 1-9: 0

## 2019-03-08 DIAGNOSIS — F33.0 MAJOR DEPRESSIVE DISORDER, RECURRENT EPISODE, MILD (H): ICD-10-CM

## 2019-03-08 DIAGNOSIS — F41.9 ANXIETY: ICD-10-CM

## 2019-03-08 NOTE — TELEPHONE ENCOUNTER
Patient called today.    Patient now lives in [facility].  Address updated.    Patient saw Curry PALOMARES at [facility] for medication Pristiq 50 MG.    Patient states that insurance is high for this medication, and would like to get a generic version called Khedezla.    Patient confirmed that this new medication is 100% covered.    Patient would like to  today [facility].    [address] [place], [place]  ([phone])    Please contact patient.    Thank you.    Central Scheduling  Fifi RIVERA

## 2019-03-28 NOTE — TELEPHONE ENCOUNTER
Pt calls today to check status.  States his insurance covers brand name Khedezla. Does not cover Pristiq or generic Pristiq.   Pending Prescriptions:                       Disp   Refills    KHEDEZLA 50 MG 24 hr tablet                                   Sig: Take by mouth daily    Jarocho, OK to change?  we apologized to Tobi for the delay.      Tobi is living in [facility] and plans on follow up with you in a few months the next time he is in [place] visiting family and friends.  This will be before September 2019.    He will follow up with pharmacy later.  Call back only if problem.     Jarocho, I am not sure why Jyothi closed this encounter from the [employer] [facility] on 3/8/19.  Garcai Valencia RN

## 2019-08-12 DIAGNOSIS — F33.0 MAJOR DEPRESSIVE DISORDER, RECURRENT EPISODE, MILD (H): ICD-10-CM

## 2019-08-12 DIAGNOSIS — F41.9 ANXIETY: ICD-10-CM

## 2019-08-12 RX ORDER — DESVENLAFAXINE 50 MG/1
TABLET, FILM COATED, EXTENDED RELEASE ORAL
Qty: 30 TABLET | Refills: 8
Start: 2019-08-12

## 2019-08-12 NOTE — TELEPHONE ENCOUNTER
"Requested Prescriptions   Pending Prescriptions Disp Refills     desvenlafaxine (PRISTIQ) 50 MG 24 hr tablet [Pharmacy Med Name: DESVENLAFAXINE SUC ER 50 MG TB]  Last Written Prescription Date:  9/14/2018  Last Fill Quantity: 90 tablet,  # refills: 3   Last office visit: 9/14/2018 with prescribing provider:  Adrián   Future Office Visit:     30 tablet 8     Sig: TAKE 1 TABLET BY MOUTH DAILY       Serotonin-Norepinephrine Reuptake Inhibitors  Failed - 8/12/2019  3:04 AM        Failed - PHQ-9 score of less than 5 in past 6 months     PHQ-9 SCORE 2/28/2017 5/9/2018 9/14/2018   PHQ-9 Total Score MyChart - - 0   PHQ-9 Total Score 4 2 0               Failed - Normal serum creatinine on file in past 12 months     No lab results found.          Failed - Recent (6 mo) or future (30 days) visit within the authorizing provider's specialty     Patient had office visit in the last 6 months or has a visit in the next 30 days with authorizing provider or within the authorizing provider's specialty.  See \"Patient Info\" tab in inbasket, or \"Choose Columns\" in Meds & Orders section of the refill encounter.            Passed - Blood pressure under 140/90 in past 12 months     BP Readings from Last 3 Encounters:   09/14/18 138/82   06/05/18 124/80   05/09/18 131/80                 Passed - Medication is active on med list        Passed - Patient is age 18 or older          "

## 2019-08-12 NOTE — TELEPHONE ENCOUNTER
Patient is not taking this medication as insurance does not cover. Taking Khedezla instead. 90 tabs and 1 refill sent on 3/2/19. Denied Pristiq at this time.     Nannette Garcia RN Flex

## 2019-08-19 NOTE — TELEPHONE ENCOUNTER
Patient is calling pharmacy told him this was denied. Patient states that he has been taking Pristiq and not Khedezla. Patient is going to be out of this medication, please review and advise.   Veronica Dahl

## 2019-08-20 RX ORDER — DESVENLAFAXINE 50 MG/1
TABLET, FILM COATED, EXTENDED RELEASE ORAL
Qty: 30 TABLET | Refills: 0 | Status: SHIPPED | OUTPATIENT
Start: 2019-08-20 | End: 2019-09-23

## 2019-08-20 NOTE — TELEPHONE ENCOUNTER
Routing refill request to provider for review/approval because:  Drug not active on patient's medication list  Last OV 9/18/18.   T'd up 30.   Garcia Valencia RN

## 2019-09-20 DIAGNOSIS — F41.9 ANXIETY: ICD-10-CM

## 2019-09-20 DIAGNOSIS — F33.0 MAJOR DEPRESSIVE DISORDER, RECURRENT EPISODE, MILD (H): ICD-10-CM

## 2019-09-20 NOTE — TELEPHONE ENCOUNTER
Patient completely out wondering if can get a refill on medication. PCP is out of office on QUINN, and Dr De La Torre is out off office too.    Last Written Prescription Date: 08/20/19  Last Fill Quantity: 30,  # refills: 0   Last office visit: 9/14/2018 with prescribing provider:  Curry Sampson    Future Office Visit:

## 2019-09-23 RX ORDER — DESVENLAFAXINE 50 MG/1
TABLET, FILM COATED, EXTENDED RELEASE ORAL
Qty: 30 TABLET | Refills: 0 | Status: SHIPPED | OUTPATIENT
Start: 2019-09-23 | End: 2019-10-01

## 2019-09-23 NOTE — TELEPHONE ENCOUNTER
Medication is being filled for 1 time refill only due to:  Patient needs to be seen because it has been more than one year since last visit.  Mel Vila RN September 23, 2019 2:21 PM

## 2019-10-01 ENCOUNTER — VIRTUAL VISIT (OUTPATIENT)
Dept: FAMILY MEDICINE | Facility: CLINIC | Age: 24
End: 2019-10-01
Payer: COMMERCIAL

## 2019-10-01 DIAGNOSIS — F33.0 MAJOR DEPRESSIVE DISORDER, RECURRENT EPISODE, MILD (H): ICD-10-CM

## 2019-10-01 DIAGNOSIS — F41.9 ANXIETY: ICD-10-CM

## 2019-10-01 PROCEDURE — 99207 ZZC NO BILLABLE SERVICE THIS VISIT: CPT | Performed by: FAMILY MEDICINE

## 2019-10-01 RX ORDER — DESVENLAFAXINE 50 MG/1
TABLET, FILM COATED, EXTENDED RELEASE ORAL
Qty: 90 TABLET | Refills: 1 | Status: SHIPPED | OUTPATIENT
Start: 2019-10-01 | End: 2020-04-13

## 2019-10-01 NOTE — PROGRESS NOTES
"Chato Victor is a 24 year old male who is being evaluated via a telephone visit.      The patient has been notified of following (by [facility].    Inventory of mental status shows issues with anxiety and depression.    Discussed regarding anxiety, sleep, anhedonia, irritability, energy,  perspective, self image, affect, current stressors, holistic parameters, work situation,  physical and social mitigating factors.    Past history has included past episodes and postive  family history.    Prior treatment has included other ssri and talk therapy is not active .    Suicidal ideation is  absent.    No problems with vision, hearing, thyroid, chest pain, dyspnea,rash,  stomach upset, polyuria, polydipsia, dysuria,muscle aches, numbness,  cough, tics or balance issues. Memory is reliable  .      \"We have found that certain health care needs can be provided without the need for a physical exam.  This service lets us provide the care you need with a short phone conversation.  If a prescription is necessary we can send it directly to your pharmacy.  If lab work is needed we can place an order for that and you can then stop by our lab to have the test done at a later time.    This telephone visit will be conducted via 3 way call with the you (the patient) , the physician/provider, and a me all on the line at the same time.  This allows your physician/provider to have the phone conversation with you while I will be taking notes for your medical record.  We will have full access to your [place] medical record during this entire phone call.    Since this is like an office visit,  will bill your insurance company for this service.  Please check with your medical insurance if this type of telephone/virtual is covered . You may be responsible for the cost of this service if insurance coverage is denied.  The typical cost is $30 (10min), $59(11-20min) and $85 (21-30min)     If during the course " "of the call the physician/provider feels a telephone visit is not appropriate, you will not be charged for this service\"    Consent has been obtained for this service by care team member: yes.  See the scanned image in the medical record.    S: The history as provided by the patient to the provider during this 3 way call include anxiety and depression stable symptosm    Pertinent parts of the the patient's medical history reviewed and confirmed by the provider included : sleep, energy , mood     Total time of call between patient and provider was 11 minutes     todd (MD signature)  ===================================================    I have reviewed the note as documented above.  This accurately captures the substance of my conversation with the patient,    Additional provider notes:  Current Outpatient Medications   Medication     desvenlafaxine (PRISTIQ) 50 MG 24 hr tablet     albuterol (PROAIR HFA/PROVENTIL HFA/VENTOLIN HFA) 108 (90 BASE) MCG/ACT Inhaler     EPINEPHrine 0.3 MG/0.3ML injection     fluticasone (FLONASE) 50 MCG/ACT spray     No current facility-administered medications for this visit.          Assessment/Plan:  No diagnosis found.              "

## 2019-11-03 ENCOUNTER — HEALTH MAINTENANCE LETTER (OUTPATIENT)
Age: 24
End: 2019-11-03

## 2020-04-13 ENCOUNTER — VIRTUAL VISIT (OUTPATIENT)
Dept: FAMILY MEDICINE | Facility: CLINIC | Age: 25
End: 2020-04-13

## 2020-04-13 DIAGNOSIS — F41.9 ANXIETY: ICD-10-CM

## 2020-04-13 DIAGNOSIS — F33.0 MAJOR DEPRESSIVE DISORDER, RECURRENT EPISODE, MILD (H): ICD-10-CM

## 2020-04-13 PROCEDURE — 99213 OFFICE O/P EST LOW 20 MIN: CPT | Mod: 95 | Performed by: FAMILY MEDICINE

## 2020-04-13 RX ORDER — DESVENLAFAXINE 50 MG/1
TABLET, FILM COATED, EXTENDED RELEASE ORAL
Qty: 90 TABLET | Refills: 1 | Status: SHIPPED | OUTPATIENT
Start: 2020-04-13 | End: 2020-10-14

## 2020-04-13 ASSESSMENT — ANXIETY QUESTIONNAIRES
2. NOT BEING ABLE TO STOP OR CONTROL WORRYING: NOT AT ALL
7. FEELING AFRAID AS IF SOMETHING AWFUL MIGHT HAPPEN: NOT AT ALL
5. BEING SO RESTLESS THAT IT IS HARD TO SIT STILL: NOT AT ALL
1. FEELING NERVOUS, ANXIOUS, OR ON EDGE: NOT AT ALL
GAD7 TOTAL SCORE: 0
IF YOU CHECKED OFF ANY PROBLEMS ON THIS QUESTIONNAIRE, HOW DIFFICULT HAVE THESE PROBLEMS MADE IT FOR YOU TO DO YOUR WORK, TAKE CARE OF THINGS AT HOME, OR GET ALONG WITH OTHER PEOPLE: NOT DIFFICULT AT ALL
3. WORRYING TOO MUCH ABOUT DIFFERENT THINGS: NOT AT ALL
6. BECOMING EASILY ANNOYED OR IRRITABLE: NOT AT ALL

## 2020-04-13 ASSESSMENT — PATIENT HEALTH QUESTIONNAIRE - PHQ9
SUM OF ALL RESPONSES TO PHQ QUESTIONS 1-9: 1
5. POOR APPETITE OR OVEREATING: NOT AT ALL

## 2020-04-13 NOTE — PROGRESS NOTES
"Chato Victor is a 24 year old male who is being evaluated via a billable telephone visit.      The patient has been notified of following:     \"This telephone visit will be conducted via a call between you and your physician/provider. We have found that certain health care needs can be provided without the need for a physical exam.  This service lets us provide the care you need with a short phone conversation.  If a prescription is necessary we can send it directly to your pharmacy.  If lab work is needed we can place an order for that and you can then stop by our lab to have the test done at a later time.    Telephone visits are billed at different rates depending on your insurance coverage. During this emergency period, for some insurers they may be billed the same as an in-person visit.  Please reach out to your insurance provider with any questions.    If during the course of the call the physician/provider feels a telephone visit is not appropriate, you will not be charged for this service.\"    Patient has given verbal consent for Telephone visit?  Yes    How would you like to obtain your AVS? Jchart    Jacob     Chato Victor is a 24 year old male who presents to clinic today for the following health issues:    Depression Followup    How are you doing with your depression since your last visit? Improved     Are you having other symptoms that might be associated with depression? No    Have you had a significant life event?  OTHER: Pandemic     Are you feeling anxious or having panic attacks?   No    Do you have any concerns with your use of alcohol or other drugs? No    Social History     Tobacco Use     Smoking status: Current Some Day Smoker     Smokeless tobacco: Never Used     Tobacco comment: socially   Substance Use Topics     Alcohol use: Yes     Alcohol/week: 0.0 standard drinks     Comment: socially      Drug use: No     PHQ 5/9/2018 9/14/2018 4/13/2020   PHQ-9 Total Score 2 0 1   Q9: " Thoughts of better off dead/self-harm past 2 weeks Not at all Not at all Not at all     INDIGO-7 SCORE 2/28/2017 9/14/2018 4/13/2020   Total Score - 0 (minimal anxiety) -   Total Score 7 0 0     Last PHQ-9 4/13/2020   1.  Little interest or pleasure in doing things 0   2.  Feeling down, depressed, or hopeless 0   3.  Trouble falling or staying asleep, or sleeping too much 1   4.  Feeling tired or having little energy 0   5.  Poor appetite or overeating 0   6.  Feeling bad about yourself 0   7.  Trouble concentrating 0   8.  Moving slowly or restless 0   Q9: Thoughts of better off dead/self-harm past 2 weeks 0   PHQ-9 Total Score 1   Difficulty at work, home, or with people Not difficult at all     INDIGO-7  4/13/2020   1. Feeling nervous, anxious, or on edge 0   2. Not being able to stop or control worrying 0   3. Worrying too much about different things 0   4. Trouble relaxing 0   5. Being so restless that it is hard to sit still 0   6. Becoming easily annoyed or irritable 0   7. Feeling afraid, as if something awful might happen 0   INDIGO-7 Total Score 0   If you checked any problems, how difficult have they made it for you to do your work, take care of things at home, or get along with other people? Not difficult at all     Has been taking Pristiq for a while now, which has been very helpful for him.  Takes 50mg once daily, and would like to get refill on this medication.  No major side effects or problems with this, but is unsure if his occasional sleep difficulties are related to this.  Has had issues with sleep since he was younger, so he does not think the medication is the cause.  Does not wish to start any other medications for sleep at this time.  No other concerns.    Patient Active Problem List   Diagnosis     Major depressive disorder, recurrent episode, mild (H)     Anxiety     Gastroesophageal reflux disease, esophagitis presence not specified     History reviewed. No pertinent surgical history.    Social  History     Tobacco Use     Smoking status: Current Some Day Smoker     Smokeless tobacco: Never Used     Tobacco comment: socially   Substance Use Topics     Alcohol use: Yes     Alcohol/week: 0.0 standard drinks     Comment: socially      Family History   Problem Relation Age of Onset     Coronary Artery Disease No family hx of      Diabetes No family hx of          Current Outpatient Medications   Medication Sig Dispense Refill     albuterol (PROAIR HFA/PROVENTIL HFA/VENTOLIN HFA) 108 (90 BASE) MCG/ACT Inhaler Inhale 2 puffs into the lungs every 6 hours as needed for shortness of breath / dyspnea or wheezing 1 Inhaler 1     desvenlafaxine (PRISTIQ) 50 MG 24 hr tablet TAKE 1 TABLET (50 MG) BY MOUTH DAILY 90 tablet 1     fluticasone (FLONASE) 50 MCG/ACT spray Spray 1 spray into both nostrils daily       EPINEPHrine 0.3 MG/0.3ML injection INJECT 0.3 ML INTRAMUSCULARLY AS NEEDED. MAY REPEAT  11     No Known Allergies    Reviewed and updated as needed this visit by Provider  Tobacco  Allergies  Meds  Problems  Med Hx  Surg Hx  Fam Hx         Review of Systems   ROS COMP: Constitutional, HEENT, cardiovascular, pulmonary, gi and gu systems are negative, except as otherwise noted.        Assessment/Plan:  1. Major depressive disorder, recurrent episode, mild (H)  Medication going well, no concerns.  Refill today, follow up in 6 months or sooner as needed.  - desvenlafaxine (PRISTIQ) 50 MG 24 hr tablet; TAKE 1 TABLET (50 MG) BY MOUTH DAILY  Dispense: 90 tablet; Refill: 1    2. Anxiety  As above, follow up in 6 months or sooner as needed.  - desvenlafaxine (PRISTIQ) 50 MG 24 hr tablet; TAKE 1 TABLET (50 MG) BY MOUTH DAILY  Dispense: 90 tablet; Refill: 1    Return in about 6 months (around 10/13/2020) for Recheck Depression/Anxiety.      Phone call duration:  5 minutes    April STUART Garner MD

## 2020-04-14 ASSESSMENT — ANXIETY QUESTIONNAIRES: GAD7 TOTAL SCORE: 0

## 2020-10-13 ENCOUNTER — TELEPHONE (OUTPATIENT)
Dept: FAMILY MEDICINE | Facility: CLINIC | Age: 25
End: 2020-10-13

## 2020-10-14 ENCOUNTER — OFFICE VISIT (OUTPATIENT)
Dept: FAMILY MEDICINE | Facility: CLINIC | Age: 25
End: 2020-10-14
Payer: COMMERCIAL

## 2020-10-14 VITALS
SYSTOLIC BLOOD PRESSURE: 118 MMHG | WEIGHT: 205 LBS | HEART RATE: 100 BPM | HEIGHT: 71 IN | DIASTOLIC BLOOD PRESSURE: 58 MMHG | TEMPERATURE: 98.5 F | BODY MASS INDEX: 28.7 KG/M2 | OXYGEN SATURATION: 98 %

## 2020-10-14 DIAGNOSIS — F07.81 POST CONCUSSION SYNDROME: ICD-10-CM

## 2020-10-14 DIAGNOSIS — F41.9 ANXIETY: ICD-10-CM

## 2020-10-14 DIAGNOSIS — Z23 NEED FOR PROPHYLACTIC VACCINATION AND INOCULATION AGAINST INFLUENZA: ICD-10-CM

## 2020-10-14 DIAGNOSIS — F33.0 MAJOR DEPRESSIVE DISORDER, RECURRENT EPISODE, MILD (H): Primary | ICD-10-CM

## 2020-10-14 PROCEDURE — 90471 IMMUNIZATION ADMIN: CPT | Performed by: PHYSICIAN ASSISTANT

## 2020-10-14 PROCEDURE — 90686 IIV4 VACC NO PRSV 0.5 ML IM: CPT | Performed by: PHYSICIAN ASSISTANT

## 2020-10-14 PROCEDURE — 99214 OFFICE O/P EST MOD 30 MIN: CPT | Performed by: PHYSICIAN ASSISTANT

## 2020-10-14 RX ORDER — DESVENLAFAXINE 50 MG/1
TABLET, FILM COATED, EXTENDED RELEASE ORAL
Qty: 90 TABLET | Refills: 1 | Status: SHIPPED | OUTPATIENT
Start: 2020-10-14 | End: 2021-03-05

## 2020-10-14 ASSESSMENT — ANXIETY QUESTIONNAIRES
7. FEELING AFRAID AS IF SOMETHING AWFUL MIGHT HAPPEN: NOT AT ALL
7. FEELING AFRAID AS IF SOMETHING AWFUL MIGHT HAPPEN: NOT AT ALL
GAD7 TOTAL SCORE: 2
GAD7 TOTAL SCORE: 2
2. NOT BEING ABLE TO STOP OR CONTROL WORRYING: NOT AT ALL
3. WORRYING TOO MUCH ABOUT DIFFERENT THINGS: NOT AT ALL
4. TROUBLE RELAXING: NOT AT ALL
1. FEELING NERVOUS, ANXIOUS, OR ON EDGE: NOT AT ALL
GAD7 TOTAL SCORE: 2
5. BEING SO RESTLESS THAT IT IS HARD TO SIT STILL: SEVERAL DAYS
6. BECOMING EASILY ANNOYED OR IRRITABLE: SEVERAL DAYS

## 2020-10-14 ASSESSMENT — PATIENT HEALTH QUESTIONNAIRE - PHQ9
10. IF YOU CHECKED OFF ANY PROBLEMS, HOW DIFFICULT HAVE THESE PROBLEMS MADE IT FOR YOU TO DO YOUR WORK, TAKE CARE OF THINGS AT HOME, OR GET ALONG WITH OTHER PEOPLE: VERY DIFFICULT
SUM OF ALL RESPONSES TO PHQ QUESTIONS 1-9: 6
SUM OF ALL RESPONSES TO PHQ QUESTIONS 1-9: 6

## 2020-10-14 ASSESSMENT — MIFFLIN-ST. JEOR: SCORE: 1919.06

## 2020-10-14 NOTE — PROGRESS NOTES
Subjective     Chato Victor is a 25 year old male who presents to clinic today for the following health issues:    History of Present Illness       Mental Health Follow-up:  Patient presents to follow-up on Depression.Patient's depression since last visit has been:  Good  The patient is having other symptoms associated with depression.      Any significant life events: health concerns  Patient is not feeling anxious or having panic attacks.  Patient has no concerns about alcohol or drug use.     Social History  Tobacco Use    Smoking status: Current Some Day Smoker    Smokeless tobacco: Never Used    Tobacco comment: socially  Alcohol use: Yes    Alcohol/week: 0.0 standard drinks    Comment: socially   Drug use: No      Today's PHQ-9         PHQ-9 Total Score:     (P) 6   PHQ-9 Q9 Thoughts of better off dead/self-harm past 2 weeks :   (P) Not at all   Thoughts of suicide or self harm:      Self-harm Plan:        Self-harm Action:          Safety concerns for self or others:           He eats 2-3 servings of fruits and vegetables daily.He consumes 0 sweetened beverage(s) daily.He exercises with enough effort to increase his heart rate 9 or less minutes per day.  He exercises with enough effort to increase his heart rate 3 or less days per week.   He is taking medications regularly.              Tried and failed effexor and wellbutrin.   Concern - Concussion  Onset: 6 months ago / while exercising/box jumping, missed edge and fell forward hitting forehead.  Did not get checked. Had nausea right after/cleared up,   Description: Difficulty in concentration, processing thoughts/mind feels sluggish-brain fog  Intensity: intermittent, mild to moderate - worse by end of day  Progression of Symptoms:  improving  Accompanying Signs & Symptoms: none  Previous history of similar problem: Yes, (4) in High School (Football & Rugby)  Precipitating factors:        Worsened by: long days at desk/computer  Alleviating factors:       "  Improved by: sleeping, hydration, taking breaks from computer screen during the day  Therapies tried and outcome: Ibuprofen for headaches, draamine    Review of Systems   Constitutional, HEENT, neuro, psych, cardiovascular, pulmonary, gi and gu systems are negative, except as otherwise noted.      Objective    /58 (BP Location: Right arm, Patient Position: Sitting, Cuff Size: Adult Large)   Pulse 100   Temp 98.5  F (36.9  C) (Oral)   Ht 1.791 m (5' 10.5\")   Wt 93 kg (205 lb)   SpO2 98%   BMI 29.00 kg/m    Body mass index is 29 kg/m .  Physical Exam   GENERAL: healthy, alert and no distress  RESP: lungs clear to auscultation - no rales, rhonchi or wheezes  CV: regular rates and rhythm, normal S1 S2, no S3 or S4 and no murmur, click or rub  NEURO: Normal strength and tone, mentation intact and speech normal  PSYCH: mentation appears normal, affect normal/bright          Assessment & Plan     Major depressive disorder, recurrent episode, mild (H)  Stable. phq 9 elevated possbly due to post concussive syndrome symptoms. Patient denies worsening depression. Will maintain current regimen and recheck phq 9 in 2 months. Will manage concussion as below. Follow up in 6 months.   - desvenlafaxine (PRISTIQ) 50 MG 24 hr tablet; TAKE 1 TABLET (50 MG) BY MOUTH DAILY  -Medication use and side effects discussed with the patient. Patient is in complete understanding and agreement with plan.       Anxiety  As above   - desvenlafaxine (PRISTIQ) 50 MG 24 hr tablet; TAKE 1 TABLET (50 MG) BY MOUTH DAILY    Post concussion syndrome  Present on history. Ongoing for 6 months. Multiple concussion in the past. Given symptoms, recommending speech/language therapy and occupational therapy through concussion clinic. Referral given.   - CONCUSSION  REFERRAL     BMI:   Estimated body mass index is 29 kg/m  as calculated from the following:    Height as of this encounter: 1.791 m (5' 10.5\").    Weight as of this encounter: 93 " kg (205 lb).              Return in about 6 months (around 4/14/2021) for depression/anxiety follow up virtual.    Curry Sampson PA-C  [facility]    Answers for HPI/ROS submitted by the patient on 10/14/2020   Chronic problems general questions HPI Form  If you checked off any problems, how difficult have these problems made it for you to do your work, take care of things at home, or get along with other people?: Very difficult  PHQ9 TOTAL SCORE: 6  INDIGO 7 TOTAL SCORE: 2

## 2020-10-15 ASSESSMENT — PATIENT HEALTH QUESTIONNAIRE - PHQ9: SUM OF ALL RESPONSES TO PHQ QUESTIONS 1-9: 6

## 2020-10-15 ASSESSMENT — ANXIETY QUESTIONNAIRES: GAD7 TOTAL SCORE: 2

## 2020-10-15 NOTE — TELEPHONE ENCOUNTER
A user error has taken place: charting done on wrong patient and has been corrected.  Garcia Valencia RN

## 2020-11-16 ENCOUNTER — HEALTH MAINTENANCE LETTER (OUTPATIENT)
Age: 25
End: 2020-11-16

## 2021-03-05 ENCOUNTER — OFFICE VISIT (OUTPATIENT)
Dept: FAMILY MEDICINE | Facility: CLINIC | Age: 26
End: 2021-03-05
Payer: COMMERCIAL

## 2021-03-05 VITALS
TEMPERATURE: 97.9 F | RESPIRATION RATE: 16 BRPM | BODY MASS INDEX: 29.82 KG/M2 | WEIGHT: 210.8 LBS | DIASTOLIC BLOOD PRESSURE: 84 MMHG | OXYGEN SATURATION: 98 % | SYSTOLIC BLOOD PRESSURE: 131 MMHG | HEART RATE: 71 BPM

## 2021-03-05 DIAGNOSIS — F07.81 POST CONCUSSION SYNDROME: Primary | ICD-10-CM

## 2021-03-05 DIAGNOSIS — F33.0 MAJOR DEPRESSIVE DISORDER, RECURRENT EPISODE, MILD (H): ICD-10-CM

## 2021-03-05 DIAGNOSIS — F41.9 ANXIETY: ICD-10-CM

## 2021-03-05 PROCEDURE — 99214 OFFICE O/P EST MOD 30 MIN: CPT | Performed by: PHYSICIAN ASSISTANT

## 2021-03-05 RX ORDER — DESVENLAFAXINE 50 MG/1
TABLET, FILM COATED, EXTENDED RELEASE ORAL
Qty: 90 TABLET | Refills: 1 | Status: SHIPPED | OUTPATIENT
Start: 2021-03-05 | End: 2021-08-03

## 2021-03-05 RX ORDER — DESVENLAFAXINE 25 MG/1
25 TABLET, EXTENDED RELEASE ORAL DAILY
Qty: 90 TABLET | Refills: 1 | Status: SHIPPED | OUTPATIENT
Start: 2021-03-05 | End: 2021-12-21

## 2021-03-05 ASSESSMENT — ANXIETY QUESTIONNAIRES
2. NOT BEING ABLE TO STOP OR CONTROL WORRYING: SEVERAL DAYS
1. FEELING NERVOUS, ANXIOUS, OR ON EDGE: SEVERAL DAYS
5. BEING SO RESTLESS THAT IT IS HARD TO SIT STILL: SEVERAL DAYS
7. FEELING AFRAID AS IF SOMETHING AWFUL MIGHT HAPPEN: NOT AT ALL
GAD7 TOTAL SCORE: 4
7. FEELING AFRAID AS IF SOMETHING AWFUL MIGHT HAPPEN: NOT AT ALL
6. BECOMING EASILY ANNOYED OR IRRITABLE: SEVERAL DAYS
4. TROUBLE RELAXING: NOT AT ALL
3. WORRYING TOO MUCH ABOUT DIFFERENT THINGS: NOT AT ALL

## 2021-03-05 ASSESSMENT — PATIENT HEALTH QUESTIONNAIRE - PHQ9
SUM OF ALL RESPONSES TO PHQ QUESTIONS 1-9: 6
10. IF YOU CHECKED OFF ANY PROBLEMS, HOW DIFFICULT HAVE THESE PROBLEMS MADE IT FOR YOU TO DO YOUR WORK, TAKE CARE OF THINGS AT HOME, OR GET ALONG WITH OTHER PEOPLE: VERY DIFFICULT
SUM OF ALL RESPONSES TO PHQ QUESTIONS 1-9: 6

## 2021-03-05 NOTE — PROGRESS NOTES
Assessment & Plan     Major depressive disorder, recurrent episode, mild (H)  History of this and controlled for years on 50 mg of pristiq. However worsened symptoms and has stemmed from recent concussion. Did not follow through with concussion clinic recommendation. Will increase pristiq to 75 mg and have see neuropsych for evaluation. Referral given. Follow up after evaluation to assess state and discuss results.   - desvenlafaxine (PRISTIQ) 50 MG 24 hr tablet; TAKE 1 TABLET (50 MG) BY MOUTH DAILY in combination with 25 mg tabs for a total of 75 mg daily.  - desvenlafaxine succinate (PRISTIQ) 25 MG 24 hr tablet; Take 1 tablet (25 mg) by mouth daily In combination with 50 mg tabs for total of 75 mg daily.  - NEUROPSYCHOLOGY REFERRAL  -Medication use and side effects discussed with the patient. Patient is in complete understanding and agreement with plan.       Anxiety  As above   - desvenlafaxine (PRISTIQ) 50 MG 24 hr tablet; TAKE 1 TABLET (50 MG) BY MOUTH DAILY in combination with 25 mg tabs for a total of 75 mg daily.  - desvenlafaxine succinate (PRISTIQ) 25 MG 24 hr tablet; Take 1 tablet (25 mg) by mouth daily In combination with 50 mg tabs for total of 75 mg daily.  - NEUROPSYCHOLOGY REFERRAL    Post concussion syndrome  As above. Patient is interested in attention aid medications. Tried and failed wellbutrin in the past. Discussed my recommendations for formal assessment and need for diagnosis of ADHD prior to any stimulants being discussed or treated. From a physical standpoint, cardiopulm exam, bp and neurologically stimulants may be option. However, needing formal assessment.   - NEUROPSYCHOLOGY REFERRAL    16 minutes spent on the date of the encounter doing chart review, patient visit and documentation        Return in about 3 months (around 6/5/2021) for depression/anxiety follow up. may be virtual. .    Curry Sampson PA-C  [facility]   Tobi is a 25  year old who presents for the following health issues    History of Present Illness       He eats 2-3 servings of fruits and vegetables daily.He consumes 0 sweetened beverage(s) daily.He exercises with enough effort to increase his heart rate 60 or more minutes per day.  He exercises with enough effort to increase his heart rate 4 days per week.   He is taking medications regularly.         Depression and Anxiety Follow-Up    How are you doing with your depression since your last visit? Worsened     How are you doing with your anxiety since your last visit?  No change    Are you having other symptoms that might be associated with depression or anxiety? Yes:  trouble concentrating.    Have you had a significant life event? Relationship Concerns, Job Concerns and Grief or Loss     Do you have any concerns with your use of alcohol or other drugs? Yes:  Nicotine    Social History     Tobacco Use     Smoking status: Former Smoker     Smokeless tobacco: Current User     Types: Snuff     Tobacco comment: socially   Substance Use Topics     Alcohol use: Yes     Alcohol/week: 0.0 standard drinks     Comment: socially      Drug use: No     PHQ 4/13/2020 10/14/2020 3/5/2021   PHQ-9 Total Score 1 6 6   Q9: Thoughts of better off dead/self-harm past 2 weeks Not at all Not at all Not at all     INDIGO-7 SCORE 4/13/2020 10/14/2020 3/5/2021   Total Score - 2 (minimal anxiety) 4 (minimal anxiety)   Total Score 0 2 4     Of note, patient has history of multiple concussions. Last visit in oct of 2020 discussed recent concussion and attention concerns and referred to concussion clinic for ot and physical therapy. Patient did not follow through with this.     Suicide Assessment Five-step Evaluation and Treatment (SAFE-T)      Review of Systems   Constitutional, neuro, psych, cardiovascular, pulmonary, gi and gu systems are negative, except as otherwise noted.      Objective    /84 (BP Location: Right arm, Patient Position: Chair,  Cuff Size: Adult Large)   Pulse 71   Temp 97.9  F (36.6  C) (Oral)   Resp 16   Wt 95.6 kg (210 lb 12.8 oz)   SpO2 98%   BMI 29.82 kg/m    Body mass index is 29.82 kg/m .  Physical Exam   GENERAL: healthy, alert and no distress  RESP: lungs clear to auscultation - no rales, rhonchi or wheezes  CV: regular rate and rhythm, normal S1 S2, no S3 or S4, no murmur, click or rub, no peripheral edema and peripheral pulses strong  PSYCH: mentation appears normal and affect flat

## 2021-03-05 NOTE — PATIENT INSTRUCTIONS
Patient Education     Behavior Changes After Brain Injury  After a brain injury, a person may behave in new or different ways and may have personality changes. He or she may become agitated or aggressive, and these mood changes may be disturbing. Some may curse, laugh, or cry out of context. Others may show increased or decreased sexual interest. Judgment may be altered. This can have financial and legal implications.   Behavior changes may be caused by damage to the brain. Or they may result from the person s increasing awareness of what has happened. Such changes may be linked to frustration, anger, or grief.   Handling feelings  Many people with brain injury have extreme mood swings. Others show no change in emotions. As a person becomes more aware, depression may set in. Signs of depression should be brought to the attention of healthcare professionals. A number of treatments are available that may be helpful for improving his or her quality of life.   Team members address the person's feelings and behavior. A team member may ask an angry person to  calm down.  If the person does so, he or she is praised for using self-control. Then the person may be asked how he or she was able to handle the emotion. If he or she knows, the technique can be used again.   Controlling agitation  A person with a brain injury may pass through stages of agitation and aggression. A healthcare professional needs to ensure that there is no physical, medical, or psychiatric cause for the agitation. If the person's safety is a concern, restraints may be used. If this happens, be sure to contact the healthcare team. Or team members may take turns staying with the person. The healthcare professional may recommend medicine. As a person becomes calmer, the team may do the following:     Point out when a behavior is not proper. Then explain what the person could do instead.    Redirect agitated actions such as pacing.    Divert the person  from tasks that are upsetting.    Reduce stimuli such as TV, phones, frequent visitors.    Try to stay away from situations that may trigger agitated or aggressive behavior.  Regaining social skills  After a brain injury, some people see only how matters relate to themselves. They may not be aware of how their actions and words affect others. Group rehab helps people with brain injuries learn to deal with others. It also improves speech. Playing games helps people link ideas and increase hand-eye skills.   You can help  Try to act in ways that teach good behavior. Also, let the person know he or she is still needed and loved. Try the tips below.     Stay calm.    Don't hold a grudge.    Don't always give in to demands.    Ignore outbursts of anger. Direct the person toward a task he or she can do.    Don't cringe, frown, roll your eyes, shake your head, or clear your throat.    Make contact. Hug, hold hands, offer a gentle touch of reassurance.  [employer] last reviewed this educational content on 4/1/2020 2000-2020 [employer]. All rights reserved. This information is not intended as a substitute for professional medical care. Always follow your healthcare professional's instructions.           Patient Education     Adjustment Disorder and Traumatic Brain Injury  A traumatic brain injury (TBI) is an injury to your brain that can change the way you think, act, and feel. Falls, fights, sports, and car accidents are common causes of a TBI.  Having a TBI and getting better after a TBI are life-changing and stressful events. Some people develop a group of symptoms called adjustment disorder after a trauma such as a TBI.  Diagnosing adjustment disorder along with a TBI is important. Adjustment disorder may make it harder for you to get involved in your TBI recovery program. It may also put you at a higher risk for drug and alcohol problems. If it is not treated, adjustment disorder may even lead to  thoughts of suicide.  Symptoms of adjustment disorder  Adjustment disorder symptoms often start within 3 months of a traumatic event. The traumatic event could have caused your TBI. It could also be a divorce, death of a loved one, worries about money, or other major changes taking place in your life. Symptoms of adjustment disorder can be bad enough to affect your everyday life at home or at work. They may include:    Sadness    Worry    Trouble sleeping    Trouble focusing    Being very tense and nervous    Crying    Trembling    Heart beating too fast or too hard (palpitations)    Making poor decisions  What to do for adjustment disorder  Many symptoms of adjustment disorder are similar to TBI symptoms. It is important to let your healthcare provider know about all your symptoms. They know about the dangers of adjustment disorder. They can connect you with a mental health provider who can help you.  Treatment for adjustment disorder is very effective. It may include a type of counseling called cognitive behavioral therapy (CBT). CBT is a form of talk therapy. It teaches you to replace negative thinking and behaviors with healthier thoughts and behaviors. You may benefit from individual sessions or group therapy.  Family therapy sessions and self-help support groups may also help. Joining a support group is a good way to share your feelings. You can also get support from others with similar problems. Medicines may be used for symptoms such as trouble sleeping or anxiety. But talk therapy is the main treatment.  Some people need medicine to treat symptoms after TBI, such as depression. In most severe cases of depression after TBI, electroconvulsive therapy (ECT) can be used.  Adjusting to recovery  Having a TBI changes your life in many ways. Stick with your treatment and rehabilitation. Here are some steps you can take to make your adjustment easier:    Take good care of yourself. Get regular exercise and eat a  healthy diet. Get regular hours of sleep.    Have an active social life. Let your friends and family become part of your recovery. Take advantage of their help and emotional support.    Find ways to reduce your stress. Ideas include deep breathing, recreation, massage, meditation, music, and spending quality time with loved ones.    Be patient with your recovery. Everybody s brain recovers at its own pace. Give yourself more time to do the things you need to do.    Don t treat your symptoms with alcohol or drugs. These substances make symptoms worse. And they will slow down the healing process.  Adjusting to life after TBI is hard. But it does get better. Remember that you are not alone. Work with your healthcare team and get support from friends and family. Be sure to let your healthcare provider know about any symptoms of adjustment disorder. Treatment is available and it works.  Leon last reviewed this educational content on 6/1/2019 2000-2020 [employer]. All rights reserved. This information is not intended as a substitute for professional medical care. Always follow your healthcare professional's instructions.

## 2021-03-06 ASSESSMENT — PATIENT HEALTH QUESTIONNAIRE - PHQ9: SUM OF ALL RESPONSES TO PHQ QUESTIONS 1-9: 6

## 2021-03-06 ASSESSMENT — ANXIETY QUESTIONNAIRES: GAD7 TOTAL SCORE: 4

## 2021-04-15 ENCOUNTER — TRANSFERRED RECORDS (OUTPATIENT)
Dept: HEALTH INFORMATION MANAGEMENT | Facility: CLINIC | Age: 26
End: 2021-04-15

## 2021-04-19 ENCOUNTER — TELEPHONE (OUTPATIENT)
Dept: FAMILY MEDICINE | Facility: CLINIC | Age: 26
End: 2021-04-19

## 2021-04-19 NOTE — TELEPHONE ENCOUNTER
General Call:     Who is calling:  [facility]    Reason for Call:   Clinic called patient, left msg to call clinic. Pt has a video visit this afternoon. No documentation from [facility] available yet so if no other concerns, provider suggests he reschedule in about 1 week. He needs report to go forward with treatment plans.     What are your questions or concerns:      Date of last appointment with provider:     Okay to leave a detailed message: at Cell number on file:    Telephone Information:   Mobile [phone]

## 2021-04-26 ENCOUNTER — VIRTUAL VISIT (OUTPATIENT)
Dept: FAMILY MEDICINE | Facility: CLINIC | Age: 26
End: 2021-04-26
Payer: COMMERCIAL

## 2021-04-26 DIAGNOSIS — R41.840 ATTENTION DEFICIT: ICD-10-CM

## 2021-04-26 DIAGNOSIS — F41.9 ANXIETY: Primary | ICD-10-CM

## 2021-04-26 DIAGNOSIS — F33.0 MAJOR DEPRESSIVE DISORDER, RECURRENT EPISODE, MILD (H): ICD-10-CM

## 2021-04-26 PROCEDURE — 99214 OFFICE O/P EST MOD 30 MIN: CPT | Mod: 95 | Performed by: PHYSICIAN ASSISTANT

## 2021-04-26 RX ORDER — BUSPIRONE HYDROCHLORIDE 5 MG/1
5 TABLET ORAL 2 TIMES DAILY
Qty: 60 TABLET | Refills: 0 | Status: SHIPPED | OUTPATIENT
Start: 2021-04-26 | End: 2021-05-21

## 2021-04-26 NOTE — PROGRESS NOTES
Tobi is a 25 year old who is being evaluated via a billable video visit.      How would you like to obtain your AVS? MyChart  If the video visit is dropped, the invitation should be resent by: Text to cell phone: [phone]  Will anyone else be joining your video visit? No    Video Start Time: 2:54    Assessment & Plan     Anxiety  Significant discussion on the proper use of stimulant medications and given no obvious signs of ADHD, would  Not recommend this. Would instead recommending focusing further on depression and anxiety and given attention being main concerns and worry for future school, anxiety is possible cause of concerns. Recommending trial of buspar at 5 mg bid and consulting with collaborative psychiatry. Patient is concerned about cost due to losing insurance next week with stopping job. Will have care coordination assess this as well. Patient is in agreement.   - MENTAL HEALTH REFERRAL  - Adult; Psychiatry; Psychiatry; G: Collaborative Care Psychiatry Service/Bridge to Long-Term Psychiatry as indicated ([phone]); Yes; Chronic Mental Health without improvement; Yes; We will contact you to schedule ...  - busPIRone (BUSPAR) 5 MG tablet; Take 1 tablet (5 mg) by mouth 2 times daily  -Medication use and side effects discussed with the patient. Patient is in complete understanding and agreement with plan.     Major depressive disorder, recurrent episode, mild (H)  As above   - MENTAL HEALTH REFERRAL  - Adult; Psychiatry; Psychiatry; [facility]: Collaborative Care Psychiatry Service/Bridge to Long-Term Psychiatry as indicated ([phone]); Yes; Chronic Mental Health without improvement; Yes; We will contact you to schedule ...    Attention deficit  As above       39 minutes spent on the date of the encounter doing chart review, review of outside records, patient visit and documentation          Return in about 1 month (around 5/26/2021) for per psych.    [facility]  "[facility]    Jacob Britton is a 25 year old who presents for the following health issues     HPI     Pt is here to follow up from some testing he had done with neurology. Trouble with concentration and memory    In summary, patient has noted worsening concentration and memory impairments over the recent months. Specifically since suffering a concussion ~1 year ago. I had sent him for evaluation through neuropsychology who did not believe he had any cognitive or attention dysfunction and recommending continued management of depression/anxiety and potentially establishing with psychiatry in the future. See EMR for abstracted report.     Patient is interested in starting stimulant medications today due to concerns with starting medical school in 3 months.          Review of Systems   Constitutional, HEENT, cardiovascular, pulmonary, GI, , musculoskeletal, neuro, skin, endocrine and psych systems are negative, except as otherwise noted.      Objective    Vitals - Patient Reported  Weight (Patient Reported): 93 kg (205 lb)  Height (Patient Reported): 179.1 cm (5' 10.5\")  BMI (Based on Pt Reported Ht/Wt): 29  Pulse (Patient Reported): 98      Vitals:  No vitals were obtained today due to virtual visit.    Physical Exam   GENERAL: Healthy, alert and no distress  EYES: Eyes grossly normal to inspection.  No discharge or erythema, or obvious scleral/conjunctival abnormalities.  RESP: No audible wheeze, cough, or visible cyanosis.  No visible retractions or increased work of breathing.    SKIN: Visible skin clear. No significant rash, abnormal pigmentation or lesions.  NEURO: Cranial nerves grossly intact.  Mentation and speech appropriate for age.  PSYCH: Mentation appears normal, affect normal/bright, judgement and insight intact, normal speech and appearance well-groomed.            Video-Visit Details    Type of service:  Video Visit    Video End Time:3:25    Originating Location (pt. " Location): Home    Distant Location (provider location):  [facility]     Platform used for Video Visit: Brandi

## 2021-04-27 ENCOUNTER — PATIENT OUTREACH (OUTPATIENT)
Dept: NURSING | Facility: CLINIC | Age: 26
End: 2021-04-27
Payer: COMMERCIAL

## 2021-04-27 NOTE — LETTER
[facility]  Complex Care Plan  About Me:    Patient Name:  Chato Victor    YOB: 1995  Age:         25 year old   [place] MRN:    [id number] Telephone Information:  Home Phone [phone]   Mobile [phone]       Address:  [address]  [facility] [address] Email address:  [email or web address]      Emergency Contact(s)    Name Relationship Lgl Grd Work Phone Home Phone Mobile Phone   PETE ROSALES Relative    [phone]           Primary language:  English     needed? No   [facility] [employer]:  [phone] op. 1  Other communication barriers: None  Preferred Method of Communication:  Mail  Current living arrangement: I live in a private home  Mobility Status/ Medical Equipment:      Health Maintenance  Health Maintenance Reviewed: Due/Overdue   Health Maintenance Due   Topic Date Due     ADVANCE CARE PLANNING  Never done     HIV SCREENING  Never done     HEPATITIS C SCREENING  Never done     PREVENTIVE CARE VISIT  09/14/2019     COVID-19 Vaccine (2 - Pfizer 2-dose series) 05/02/2021       My Access Plan  Medical Emergency 911   Primary Clinic Line [facility] - [phone]   24 Hour Appointment Line [phone] or  [phone] ([phone]) (toll-free)   24 Hour Nurse Line [phone] (toll-free)   Preferred Urgent Care     Preferred Hospital     Preferred Pharmacy [facility] [place], [place] - [address] AT [place] [address]     Behavioral Health Crisis Line The National Suicide Prevention Lifeline at 1-770.966.6723 or 911             My Care Team Members  Patient Care Team       Relationship Specialty Notifications Start End    Curry Sampson PA-C PCP - General Physician Assistant  9/23/16     Phone: [phone] Fax: [phone] [address] [facility] [address]    Curry Sampson PA-C Assigned PCP   10/18/20     Phone: [phone] Fax:  [phone]         [address] [facility] [address]    Ami Crocker LSW Lead Care Coordinator Primary Care - CC  4/28/21     Phone: [phone]         Margareth Martin MA Community Health Worker   4/28/21             My Care Plans  Self Management and Treatment Plan  Goals and (Comments)  Goals        General    Financial Wellbeing (pt-stated)     Notes - Note created  4/28/2021  3:37 PM by Ami Crocker LSW    Goal Statement: I will apply for health insurance within the next 1-2 weeks if I am eligible.   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 I understand a referral was placed to the Financial Resource Worker, I will receive a call within the next 3 business days.    2. I understand the financial worker will make two attempts to call me. If I still need help with this goal, I will connect with my Community Health Worker Margareth at [phone].                    Action Plans on File:                       Advance Care Plans/Directives Type:   [facility]    Advance Care Planning and Health Care Directives  When it comes to decisions about your health care, it s important that your voice is heard. You may not always be able to speak for yourself.    We encourage you to have discussions with your loved ones, cultural or spiritual leaders and health care providers about your goals, values, beliefs and choices.    We are a part of [employer] [employer] [place] , supporting and promoting the benefits of advance care planning conversations.    Our goals are to:    Help you make informed decisions about your healthcare choices and ensure that those choices are honored    Offer advance care planning discussions with trained staff    Ensure your choices  are clearly defined, documented and available in your medical record    Translate your choices into medical orders as needed    Why is Advance Care Planning important?    Know what your health care choices are and decide what is right for you    An unexpected illness or injury could leave you unable to participate in important treatment decisions    When choices are left to others to decide that responsibility can be difficult and stressful    By discussing and outlining your choices, your voice is heard in the care you want to receive    How can I learn more?  We offer free classes at multiple locations, days and times. Our trained facilitators will provide information and guide you through a Health Care Directive document. They can also review, notarize and add your document to your medical record.    Call "[employer] ([employer] at [phone] or toll free at [phone] for assistance.       My Medical and Care Information  Problem List   Patient Active Problem List   Diagnosis     Major depressive disorder, recurrent episode, mild (H)     Anxiety     Gastroesophageal reflux disease, esophagitis presence not specified      Current Medications and Allergies:  See printed Medication Report.    Care Coordination Start Date: 4/27/2021   Frequency of Care Coordination: monthly   Form Last Updated: 04/28/2021

## 2021-04-27 NOTE — LETTER
[facility] CARE COORDINATION  [facility]   April 28, 2021    Chato Victor  [address] [facility] [address]      Dear Chato,    I am a clinic care coordinator who works with Curry Sampson PA-C at [facility]  I wanted to thank you for spending the time to talk with me.  Below is a description of clinic care coordination and how I can further assist you.      The clinic care coordination team is made up of a registered nurse,  and community health worker who understand the health care system. The goal of clinic care coordination is to help you manage your health and improve access to the health care system in the most efficient manner. The team can assist you in meeting your health care goals by providing education, coordinating services, strengthening the communication among your providers and supporting you with any resource needs.    Please feel free to contact me at [phone] with any questions or concerns. We are focused on providing you with the highest-quality healthcare experience possible and that all starts with you.     Sincerely,     ANA Jones   Care Coordination Team  [phone]      Enclosed: I have enclosed a copy of the Complex Care Plan. This has helpful information and goals that we have talked about. Please keep this in an easy to access place to use as needed.

## 2021-04-28 SDOH — ECONOMIC STABILITY: TRANSPORTATION INSECURITY
IN THE PAST 12 MONTHS, HAS LACK OF TRANSPORTATION KEPT YOU FROM MEETINGS, WORK, OR FROM GETTING THINGS NEEDED FOR DAILY LIVING?: NO

## 2021-04-28 SDOH — ECONOMIC STABILITY: FOOD INSECURITY: WITHIN THE PAST 12 MONTHS, THE FOOD YOU BOUGHT JUST DIDN'T LAST AND YOU DIDN'T HAVE MONEY TO GET MORE.: NEVER TRUE

## 2021-04-28 SDOH — SOCIAL STABILITY: SOCIAL NETWORK
IN A TYPICAL WEEK, HOW MANY TIMES DO YOU TALK ON THE PHONE WITH FAMILY, FRIENDS, OR NEIGHBORS?: MORE THAN THREE TIMES A WEEK

## 2021-04-28 SDOH — ECONOMIC STABILITY: FOOD INSECURITY: WITHIN THE PAST 12 MONTHS, YOU WORRIED THAT YOUR FOOD WOULD RUN OUT BEFORE YOU GOT MONEY TO BUY MORE.: NEVER TRUE

## 2021-04-28 SDOH — ECONOMIC STABILITY: TRANSPORTATION INSECURITY
IN THE PAST 12 MONTHS, HAS THE LACK OF TRANSPORTATION KEPT YOU FROM MEDICAL APPOINTMENTS OR FROM GETTING MEDICATIONS?: NO

## 2021-04-28 SDOH — ECONOMIC STABILITY: INCOME INSECURITY: HOW HARD IS IT FOR YOU TO PAY FOR THE VERY BASICS LIKE FOOD, HOUSING, MEDICAL CARE, AND HEATING?: NOT HARD AT ALL

## 2021-04-28 ASSESSMENT — ACTIVITIES OF DAILY LIVING (ADL): DEPENDENT_IADLS:: INDEPENDENT

## 2021-04-28 NOTE — PROGRESS NOTES
Clinic Care Coordination Contact  Financial Resource Worker Screening    County Benefits  Is patient requesting help applying for [facility] benefits?: No    [place]:  [place]  Is this an insurance renewal?: Yes  Is this a new insurance application request?: Yes  Have you recently applied for insurance?: No  How many people in your household? : 2  Do you file taxes?: Yes  How many dependents do you claim?: 0  What is the monthly gross income for the household (wages, social security, workers comp, and pension)? 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 Lives with kathryn but we did not include her income    Care Coordination team will tell patient:   Thank you for answering all the questions, based on screening questions, our Financial Resource Worker will reach out to you with additional questions and next steps.    Clinic Care Coordination Contact    Clinic Care Coordination Contact  OUTREACH    Referral Information:  Referral Source: PCP    Primary Diagnosis: Other (include Comment box)    Chief Complaint   Patient presents with     Clinic Care Coordination - Initial     Health Insurance        Universal Utilization: 13%  Admission or ED Risk  Clinic Utilization  Difficulty keeping appointments:: No  Compliance Concerns: No  No-Show Concerns: No  No PCP office visit in Past Year: No  Utilization    Last refreshed: 4/27/2021  2:04 PM: Hospital Admissions 0           Last refreshed: 4/27/2021  2:04 PM: ED Visits 0           Last refreshed: 4/27/2021  2:04 PM: No Show Count (past year) 0              Current as of: 4/27/2021  2:04 PM              Clinical Concerns:  Current Medical Concerns:    Patient Active Problem List   Diagnosis     Major depressive disorder, recurrent episode, mild (H)     Anxiety     Gastroesophageal reflux disease, esophagitis presence  not specified       CC referral placed to explore health insurance options when Chato won't have health insurance in place. PCP Recommending trial of buspar at 5 mg bid and consulting with collaborative psychiatry. Patient is concerned about cost due to losing insurance next week with stopping job.    JAYDEN CC spoke by phone with Chato.  He will end his job this month with his last paycheck being received May 7.  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  He is wanting to see if he can get on health insurance for that period of time. If it will be a long turn around time before he can begin receiving insurnace he states it won't be worthwhile for him.   JAYDEN CC placing a FRW referral.       Current Behavioral Concerns: Anxiety concerns discussed with PCP.    Education Provided to patient: CC role, Mmnsure, FRW role   Pain  Pain (GOAL):: No  Health Maintenance Reviewed: Due/Overdue   Health Maintenance Due   Topic Date Due     ADVANCE CARE PLANNING  Never done     HIV SCREENING  Never done     HEPATITIS C SCREENING  Never done     PREVENTIVE CARE VISIT  09/14/2019     COVID-19 Vaccine (2 - Pfizer 2-dose series) 05/02/2021     Clinical Pathway: None    Medication Management:  No concerns noted during this encounter     Functional Status:  Dependent ADLs:: Independent  Dependent IADLs:: Independent  Bed or wheelchair confined:: No  Fallen 2 or more times in the past year?: No  Any fall with injury in the past year?: No    Living Situation:  Current living arrangement:: I live in a private home  Type of residence:: Private home - no stairs    Lifestyle & Psychosocial Needs:     Social Needs     Financial resource strain: Not hard at all     Food insecurity     Worry: Never true     Inability: Never true     Transportation needs     Medical: No     Non-medical: No     Inadequate nutrition (GOAL):: No  Tube Feeding: No  Inadequate activity/exercise (GOAL):: No  Significant  changes in sleep pattern (GOAL): No  Transportation means:: Accessible car     Religion or spiritual beliefs that impact treatment:: No  Mental health DX:: No  Mental health management concern (GOAL):: No  Chemical Dependency Status: No Current Concerns   Socioeconomic History     Marital status: Single     Spouse name: Not on file     Number of children: Not on file     Years of education: Not on file     Highest education level: Not on file   Relationships     Social connections     Talks on phone: More than three times a week     Gets together: Not on file     Attends Gnosticist service: Not on file     Active member of club or organization: Not on file     Attends meetings of clubs or organizations: Not on file     Relationship status: Not on file     Intimate partner violence     Fear of current or ex partner: Not on file     Emotionally abused: Not on file     Physically abused: Not on file     Forced sexual activity: Not on file     Tobacco Use     Smoking status: Former Smoker     Smokeless tobacco: Current User     Types: Snuff     Tobacco comment: socially   Substance and Sexual Activity     Alcohol use: Yes     Alcohol/week: 0.0 standard drinks     Comment: socially      Drug use: No     Sexual activity: Yes     Partners: Female          Resources and Interventions:  Current Resources:      Community Resources: None  Supplies used at home:: None  Equipment Currently Used at Home: none  Employment Status: unemployed)   )    Advance Care Plan/Directive  Advanced Care Plans/Directives on file:: No    Referrals Placed: Financial Services     Goals:   Goals        General    Financial Wellbeing (pt-stated)     Notes - Note created  4/28/2021  3:37 PM by Ami Crocker LSW    Goal Statement: I will apply for health insurance within the next 1-2 weeks if I am eligible.   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 I understand a referral was placed to the Financial Resource Worker, I will receive a call within the next 3 business days.    2. I understand the financial worker will make two attempts to call me. If I still need help with this goal, I will connect with my Community Health Worker Margareth at [phone].                   Patient/Caregiver understanding: Yes    Outreach Frequency: monthly  Future Appointments              In 1 month Radha Zambrano APRN [facility], [facility]          Plan: FRW placed to assist [facility] with [facility].  CHW will outreach in ywo weeks.    ANA Jones   Care Coordination Team  [phone]

## 2021-04-29 ENCOUNTER — PATIENT OUTREACH (OUTPATIENT)
Dept: CARE COORDINATION | Facility: CLINIC | Age: 26
End: 2021-04-29

## 2021-04-29 NOTE — PROGRESS NOTES
Clinic Care Coordination Contact  Program: Health Insurance   County: [facility] Case #:  [facility] Worker:   Brooke #:   Subscriber #:   Renewal:  Date Applied:     FRW Outreach:  4/30/2021: FRW called patient and the line went to . FRW called patient back after 11:00 and left a  with call back information. FRW will make outreach in 1 week. Patient called back and we went through the [place] screening and discussed health insurance and his options. Patient would like to think about what he would like to do and will call FRW back in a week or FRW will call patient back.   4/29/2021: FRW called patient and he's in a work meeting, he asked FRW to call back tomorrow between 10:30-11:00. FRW will make outreach at that time.     Health Insurance Screening: MNSURE   1. Do you currently have health insurance, did you receive a renewal? Yes, ends today  2. If you applied through Mnsure, do you know your username/password? Yes, not sure of  3. How many people in the household? 1, lives with kathryn   4. Do you file taxes, who do you file with? Yes, files on his own  5. What is your monthly income (include all tax members)? 0 worked until 4/30/21.  6. Do you have access to insurance through an employer (if yes, need EIN)? No  7. Do you have social security cards and/or green cards?      Health Insurance:      Referral/Screening:    Financial Resource Worker Screening     [facility] Benefits  Is patient requesting help applying for [facility] benefits?: No     [place]:  [place]  Is this an insurance renewal?: Yes  Is this a new insurance application request?: Yes  Have you recently applied for insurance?: No  How many people in your household? : 2  Do you file taxes?: Yes  How many dependents do you claim?: 0  What is the monthly gross income for the household (wages, social security, workers comp, and pension)? 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 Lives with kathryn but we did not include her income     Care Coordination team will tell patient:   Thank you for answering all the questions, based on screening questions, our Financial Resource Worker will reach out to you with additional questions and next steps.    Financial Resource Worker Follow Up    Goals:   Goals Addressed as of 4/30/2021 at 11:22 AM                 4/29/21      Financial Wellbeing (pt-stated)   20%    Added 4/28/21 by Ami Crocker LSW     Goal Statement: I will apply for health insurance within the next 1-2 weeks if I am eligible.   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 I understand a referral was placed to the Financial Resource Worker, I will receive a call within the next 3 business days.    2. I understand the financial worker will make two attempts to call me. If I still need help with this goal, I will connect with my Community Health Worker Margareth at [phone].                   Intervention and Education during outreach: n/a    FRW Plan: FRW will make outreach in 1 week.

## 2021-05-07 ENCOUNTER — PATIENT OUTREACH (OUTPATIENT)
Dept: CARE COORDINATION | Facility: CLINIC | Age: 26
End: 2021-05-07

## 2021-05-07 NOTE — PROGRESS NOTES
Clinic Care Coordination Contact  Program: Health Insurance   County: [facility] Case #:  [facility] Worker:   Brooke #:   Subscriber #:   Renewal:  Date Applied:     FRBAHMAN Outreach:  5/7/20/21: FRW called patient and he states he spoke to his family and they decided it would be best for him to wait until he moves for school to apply for insurance. FRW advised patient if he changes his mind he can be re-referred. FRW removed patient from panel and resolved the FRW episode. Please refer to FRW for future needs.   4/30/2021: FRW called patient and the line went to . FRW called patient back after 11:00 and left a  with call back information. FRW will make outreach in 1 week. Patient called back and we went through the [place] screening and discussed health insurance and his options. Patient would like to think about what he would like to do and will call FRW back in a week or FRW will call patient back.   4/29/2021: FRW called patient and he's in a work meeting, he asked FRW to call back tomorrow between 10:30-11:00. FRW will make outreach at that time.     Health Insurance Screening: MNSURE   1. Do you currently have health insurance, did you receive a renewal? Yes, ends today  2. If you applied through Mnsure, do you know your username/password? Yes, not sure of  3. How many people in the household? 1, lives with fiance   4. Do you file taxes, who do you file with? Yes, files on his own  5. What is your monthly income (include all tax members)? 0 worked until 4/30/21.  6. Do you have access to insurance through an employer (if yes, need EIN)? No  7. Do you have social security cards and/or green cards?      Health Insurance:      Referral/Screening:    Financial Resource Worker Screening     [facility] Benefits  Is patient requesting help applying for [facility] benefits?: No     [place]:  [place]  Is this an insurance renewal?: Yes  Is this a new insurance application request?:  Yes  Have you recently applied for insurance?: No  How many people in your household? : 2  Do you file taxes?: Yes  How many dependents do you claim?: 0  What is the monthly gross income for the household (wages, social security, workers comp, and pension)? 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 Lives with kathryn but we did not include her income     Care Coordination team will tell patient:   Thank you for answering all the questions, based on screening questions, our Financial Resource Worker will reach out to you with additional questions and next steps.    Financial Resource Worker Follow Up    Goals:       Intervention and Education during outreach: n/a    FRW Plan: n/a

## 2021-05-18 ENCOUNTER — OFFICE VISIT (OUTPATIENT)
Dept: FAMILY MEDICINE | Facility: CLINIC | Age: 26
End: 2021-05-18
Payer: COMMERCIAL

## 2021-05-18 ENCOUNTER — PATIENT OUTREACH (OUTPATIENT)
Dept: NURSING | Facility: CLINIC | Age: 26
End: 2021-05-18
Payer: COMMERCIAL

## 2021-05-18 VITALS
HEIGHT: 71 IN | WEIGHT: 211 LBS | OXYGEN SATURATION: 99 % | BODY MASS INDEX: 29.54 KG/M2 | SYSTOLIC BLOOD PRESSURE: 134 MMHG | DIASTOLIC BLOOD PRESSURE: 71 MMHG | TEMPERATURE: 98.7 F | HEART RATE: 96 BPM

## 2021-05-18 DIAGNOSIS — Z78.9 VARICELLA VACCINATION STATUS UNKNOWN: ICD-10-CM

## 2021-05-18 DIAGNOSIS — Z00.00 ROUTINE GENERAL MEDICAL EXAMINATION AT A HEALTH CARE FACILITY: Primary | ICD-10-CM

## 2021-05-18 DIAGNOSIS — Z11.1 SCREENING EXAMINATION FOR PULMONARY TUBERCULOSIS: ICD-10-CM

## 2021-05-18 PROCEDURE — 99395 PREV VISIT EST AGE 18-39: CPT | Performed by: PHYSICIAN ASSISTANT

## 2021-05-18 PROCEDURE — 86481 TB AG RESPONSE T-CELL SUSP: CPT | Performed by: PHYSICIAN ASSISTANT

## 2021-05-18 PROCEDURE — 86787 VARICELLA-ZOSTER ANTIBODY: CPT | Performed by: PHYSICIAN ASSISTANT

## 2021-05-18 PROCEDURE — 36415 COLL VENOUS BLD VENIPUNCTURE: CPT | Performed by: PHYSICIAN ASSISTANT

## 2021-05-18 ASSESSMENT — ENCOUNTER SYMPTOMS
FEVER: 0
COUGH: 0
SHORTNESS OF BREATH: 0
NAUSEA: 0
MYALGIAS: 0
ARTHRALGIAS: 0
SORE THROAT: 0
ABDOMINAL PAIN: 0
CHILLS: 0
HEADACHES: 0
EYE PAIN: 0
HEMATOCHEZIA: 0
HEARTBURN: 0
PALPITATIONS: 0
DIARRHEA: 0
DIZZINESS: 0
HEMATURIA: 0
DYSURIA: 0
PARESTHESIAS: 0
JOINT SWELLING: 0
FREQUENCY: 0
NERVOUS/ANXIOUS: 0
WEAKNESS: 0
CONSTIPATION: 0

## 2021-05-18 ASSESSMENT — MIFFLIN-ST. JEOR: SCORE: 1954.22

## 2021-05-18 ASSESSMENT — ANXIETY QUESTIONNAIRES
5. BEING SO RESTLESS THAT IT IS HARD TO SIT STILL: NOT AT ALL
2. NOT BEING ABLE TO STOP OR CONTROL WORRYING: NOT AT ALL
4. TROUBLE RELAXING: NOT AT ALL
1. FEELING NERVOUS, ANXIOUS, OR ON EDGE: NOT AT ALL
7. FEELING AFRAID AS IF SOMETHING AWFUL MIGHT HAPPEN: NOT AT ALL
7. FEELING AFRAID AS IF SOMETHING AWFUL MIGHT HAPPEN: NOT AT ALL
6. BECOMING EASILY ANNOYED OR IRRITABLE: SEVERAL DAYS
GAD7 TOTAL SCORE: 1
3. WORRYING TOO MUCH ABOUT DIFFERENT THINGS: NOT AT ALL
GAD7 TOTAL SCORE: 1
GAD7 TOTAL SCORE: 1

## 2021-05-18 ASSESSMENT — PATIENT HEALTH QUESTIONNAIRE - PHQ9
SUM OF ALL RESPONSES TO PHQ QUESTIONS 1-9: 2
10. IF YOU CHECKED OFF ANY PROBLEMS, HOW DIFFICULT HAVE THESE PROBLEMS MADE IT FOR YOU TO DO YOUR WORK, TAKE CARE OF THINGS AT HOME, OR GET ALONG WITH OTHER PEOPLE: NOT DIFFICULT AT ALL
SUM OF ALL RESPONSES TO PHQ QUESTIONS 1-9: 2

## 2021-05-18 NOTE — PROGRESS NOTES
3  SUBJECTIVE:   CC: Chato Victor is an 25 year old male who presents for preventive health visit.       Patient has been advised of split billing requirements and indicates understanding: Yes  Healthy Habits:  Answers for HPI/ROS submitted by the patient on 5/18/2021   Annual Exam:  If you checked off any problems, how difficult have these problems made it for you to do your work, take care of things at home, or get along with other people?: Not difficult at all  PHQ9 TOTAL SCORE: 2  INDIGO 7 TOTAL SCORE: 1  Frequency of exercise:: 2-3 days/week  Getting at least 3 servings of Calcium per day:: Yes  Diet:: Regular (no restrictions)  Taking medications regularly:: Yes  Medication side effects:: Not applicable  Bi-annual eye exam:: NO  Dental care twice a year:: Yes  Sleep apnea or symptoms of sleep apnea:: Daytime drowsiness  abdominal pain: No  Blood in stool: No  Blood in urine: No  chest pain: No  chills: No  congestion: No  constipation: No  cough: No  diarrhea: No  dizziness: No  ear pain: No  eye pain: No  nervous/anxious: No  fever: No  frequency: No  genital sores: No  headaches: No  hearing loss: No  heartburn: No  arthralgias: No  joint swelling: No  peripheral edema: No  mood changes: No  myalgias: No  nausea: No  dysuria: No  palpitations: No  Skin sensation changes: No  sore throat: No  urgency: No  rash: No  shortness of breath: No  visual disturbance: No  weakness: No  impotence: No  penile discharge: No  Additional concerns today:: No  Duration of exercise:: Greater than 60 minutes    Form needed for school     Today's PHQ-2 Score:   PHQ-2 ( 1999 Pfizer) 5/18/2021 3/5/2021   Q1: Little interest or pleasure in doing things 0 1   Q2: Feeling down, depressed or hopeless 0 1   PHQ-2 Score 0 2   Q1: Little interest or pleasure in doing things Not at all Several days   Q2: Feeling down, depressed or hopeless Not at all Several days   PHQ-2 Score 0 2     Abuse: Current or Past(Physical, Sexual or  Emotional)- No  Do you feel safe in your environment? Yes        Social History     Tobacco Use     Smoking status: Former Smoker     Smokeless tobacco: Current User     Types: Chew     Tobacco comment: socially   Substance Use Topics     Alcohol use: Yes     Alcohol/week: 0.0 standard drinks     Comment: socially      If you drink alcohol do you typically have >3 drinks per day or >7 drinks per week? No                      Last PSA: No results found for: PSA    Reviewed orders with patient. Reviewed health maintenance and updated orders accordingly - Yes  BP Readings from Last 3 Encounters:   05/18/21 134/71   03/05/21 131/84   10/14/20 118/58    Wt Readings from Last 3 Encounters:   05/18/21 95.7 kg (211 lb)   03/05/21 95.6 kg (210 lb 12.8 oz)   10/14/20 93 kg (205 lb)                  Patient Active Problem List   Diagnosis     Major depressive disorder, recurrent episode, mild (H)     Anxiety     Gastroesophageal reflux disease, esophagitis presence not specified     History reviewed. No pertinent surgical history.    Social History     Tobacco Use     Smoking status: Former Smoker     Smokeless tobacco: Current User     Types: Chew     Tobacco comment: socially   Substance Use Topics     Alcohol use: Yes     Alcohol/week: 0.0 standard drinks     Comment: socially      Family History   Problem Relation Age of Onset     Coronary Artery Disease No family hx of      Diabetes No family hx of          Current Outpatient Medications   Medication Sig Dispense Refill     albuterol (PROAIR HFA/PROVENTIL HFA/VENTOLIN HFA) 108 (90 BASE) MCG/ACT Inhaler Inhale 2 puffs into the lungs every 6 hours as needed for shortness of breath / dyspnea or wheezing 1 Inhaler 1     busPIRone (BUSPAR) 5 MG tablet Take 1 tablet (5 mg) by mouth 2 times daily 60 tablet 0     desvenlafaxine (PRISTIQ) 50 MG 24 hr tablet TAKE 1 TABLET (50 MG) BY MOUTH DAILY in combination with 25 mg tabs for a total of 75 mg daily. 90 tablet 1      "desvenlafaxine succinate (PRISTIQ) 25 MG 24 hr tablet Take 1 tablet (25 mg) by mouth daily In combination with 50 mg tabs for total of 75 mg daily. 90 tablet 1     EPINEPHrine 0.3 MG/0.3ML injection INJECT 0.3 ML INTRAMUSCULARLY AS NEEDED. MAY REPEAT  11     fluticasone (FLONASE) 50 MCG/ACT spray Spray 1 spray into both nostrils daily       No Known Allergies  Recent Labs   Lab Test 09/14/18  0759   LDL 98   HDL 55   TRIG 132        Reviewed and updated as needed this visit by clinical staff  Tobacco  Allergies  Meds  Problems  Med Hx  Surg Hx  Fam Hx  Soc Hx          Reviewed and updated as needed this visit by Provider  Tobacco  Allergies  Meds  Problems  Med Hx  Surg Hx  Fam Hx         Past Medical History:   Diagnosis Date     Depression       History reviewed. No pertinent surgical history.    ROS:  CONSTITUTIONAL: NEGATIVE for fever, chills, change in weight  INTEGUMENTARY/SKIN: NEGATIVE for worrisome rashes, moles or lesions  EYES: NEGATIVE for vision changes or irritation  ENT: NEGATIVE for ear, mouth and throat problems  RESP: NEGATIVE for significant cough or SOB  CV: NEGATIVE for chest pain, palpitations or peripheral edema  GI: NEGATIVE for nausea, abdominal pain, heartburn, or change in bowel habits   male: negative for dysuria, hematuria, decreased urinary stream, erectile dysfunction, urethral discharge  MUSCULOSKELETAL: NEGATIVE for significant arthralgias or myalgia  NEURO: NEGATIVE for weakness, dizziness or paresthesias  PSYCHIATRIC: NEGATIVE for changes in mood or affect    OBJECTIVE:   /71   Pulse 96   Temp 98.7  F (37.1  C) (Oral)   Ht 1.803 m (5' 11\")   Wt 95.7 kg (211 lb)   SpO2 99%   BMI 29.43 kg/m    EXAM:  GENERAL: healthy, alert and no distress  EYES: Eyes grossly normal to inspection, PERRL and conjunctivae and sclerae normal  HENT: ear canals and TM's normal, nose and mouth without ulcers or lesions  NECK: no adenopathy, no asymmetry, masses, or scars and " "thyroid normal to palpation  RESP: lungs clear to auscultation - no rales, rhonchi or wheezes  CV: regular rate and rhythm, normal S1 S2, no S3 or S4, no murmur, click or rub, no peripheral edema and peripheral pulses strong  ABDOMEN: soft, nontender, no hepatosplenomegaly, no masses and bowel sounds normal   (male): normal male genitalia without lesions or urethral discharge, no hernia  MS: no gross musculoskeletal defects noted, no edema  SKIN: no suspicious lesions or rashes  NEURO: Normal strength and tone, mentation intact and speech normal  PSYCH: mentation appears normal, affect normal/bright  LYMPH: no cervical adenopathy    Diagnostic Test Results:  none     ASSESSMENT/PLAN:   (Z00.00) Routine general medical examination at a health care facility  (primary encounter diagnosis)  Comment: stable wellness exam. Normal fasting labs 3 years ago. Patient defers today.   Plan:     (Z78.9) Varicella vaccination status unknown  Comment:   Plan: Varicella Zoster Virus Antibody IgG            (Z11.1) Screening examination for pulmonary tuberculosis  Comment:   Plan: Quantiferon-TB Gold Plus              Patient has been advised of split billing requirements and indicates understanding: Yes  COUNSELING:  Reviewed preventive health counseling, as reflected in patient instructions       Regular exercise       Healthy diet/nutrition       self testicular exams    Estimated body mass index is 29.43 kg/m  as calculated from the following:    Height as of this encounter: 1.803 m (5' 11\").    Weight as of this encounter: 95.7 kg (211 lb).    Weight management plan: Discussed healthy diet and exercise guidelines    He reports that he has quit smoking. His smokeless tobacco use includes chew.      Counseling Resources:  ATP IV Guidelines  Pooled Cohorts Equation Calculator  FRAX Risk Assessment  ICSI Preventive Guidelines  Dietary Guidelines for Americans, 2010  USDA's MyPlate  ASA Prophylaxis  Lung CA Screening    [place]ry " Raul Sampson PA-C  [facility]

## 2021-05-18 NOTE — LETTER
[facility] CARE COORDINATION  [facility]  May 18, 2021    Chato Victor  [address] [address] [id number]      Dear Chato,    Thank you for taking the time to speak with me today. At this time the Care Coordination team will make no further attempts to reach you, however this does not change your ability to continue receiving care from your providers at your primary care clinic. If you need additional support from a care coordinator in the future please contact me at [phone].    All of us at the [facility] are invested in your health and are here to assist you in meeting your goals.     Sincerely,    FELICIA Serrano, B.S. [facility] Care Coordination  [facility]:  [place], [place] and [place]  Phone: [phone]

## 2021-05-18 NOTE — PROGRESS NOTES
Clinic Care Coordination Contact  Community Health Worker Follow Up  Spoke with patient     Goals: Previously deleted by FRW, patient is no longer interested applying for health insurance at this time. Will wait until he is in school in about one month.       Intervention and Education during outreach: Patient has appointment today for physical for school, wondering how much this will cost. CHW informed patient that if/when he gets health insurance through school they may be able to back pay/cover the visit especially since it was required by them.   Patient plans to discuss this with his school. He states that he has no outstanding questions, concerns, or needs at this time. Thanks CC team for offering assistance.     CHW Plan: CHW huddled with [facility], advised to disenroll patient rather than maintenance at this time. Patient sent disenrollment letter and encouraged to contact CC team with any future questions or concerns.     FELICIA Serrano, B.S. [facility]  Clinic Care Coordination  [facility]:  [place], [place] and [place]  Phone: [phone]

## 2021-05-19 LAB
GAMMA INTERFERON BACKGROUND BLD IA-ACNC: 0.29 IU/ML
M TB IFN-G CD4+ BCKGRND COR BLD-ACNC: 9.71 IU/ML
M TB TUBERC IFN-G BLD QL: NEGATIVE
MITOGEN IGNF BCKGRD COR BLD-ACNC: 0 IU/ML
MITOGEN IGNF BCKGRD COR BLD-ACNC: 0 IU/ML
VZV IGG SER QL IA: 3.9 AI (ref 0–0.8)

## 2021-05-19 ASSESSMENT — ANXIETY QUESTIONNAIRES: GAD7 TOTAL SCORE: 1

## 2021-05-19 ASSESSMENT — PATIENT HEALTH QUESTIONNAIRE - PHQ9: SUM OF ALL RESPONSES TO PHQ QUESTIONS 1-9: 2

## 2021-05-20 DIAGNOSIS — F41.9 ANXIETY: ICD-10-CM

## 2021-05-21 RX ORDER — BUSPIRONE HYDROCHLORIDE 5 MG/1
TABLET ORAL
Qty: 60 TABLET | Refills: 0 | Status: SHIPPED | OUTPATIENT
Start: 2021-05-21 | End: 2021-06-10

## 2021-06-09 DIAGNOSIS — F41.9 ANXIETY: ICD-10-CM

## 2021-06-10 RX ORDER — BUSPIRONE HYDROCHLORIDE 5 MG/1
TABLET ORAL
Qty: 60 TABLET | Refills: 0 | Status: SHIPPED | OUTPATIENT
Start: 2021-06-10 | End: 2021-12-21

## 2021-08-03 ENCOUNTER — TELEPHONE (OUTPATIENT)
Dept: FAMILY MEDICINE | Facility: CLINIC | Age: 26
End: 2021-08-03

## 2021-08-03 ENCOUNTER — NURSE TRIAGE (OUTPATIENT)
Dept: NURSING | Facility: CLINIC | Age: 26
End: 2021-08-03

## 2021-08-03 DIAGNOSIS — F41.9 ANXIETY: ICD-10-CM

## 2021-08-03 DIAGNOSIS — F33.0 MAJOR DEPRESSIVE DISORDER, RECURRENT EPISODE, MILD (H): ICD-10-CM

## 2021-08-03 NOTE — TELEPHONE ENCOUNTER
Fax from [facility]MM via [facility] [place] [place], new pharmacy    Pharmacy Benefit Information:    Provider: ANGELICA  CMM: KEY: [id number]  Medication/SIG: desvenlafaxine 50 mg  Pharmacy: [facility]    LM for pt to call, see rx from March, confirm with pt if needs PA, new pharmacy asking for it, has he moved?, routed to triage to await call  Lizbeth Byrne RN, BSN  Message handled by CLINIC NURSE.

## 2021-08-04 RX ORDER — DESVENLAFAXINE 50 MG/1
TABLET, FILM COATED, EXTENDED RELEASE ORAL
Qty: 90 TABLET | Refills: 0 | Status: SHIPPED | OUTPATIENT
Start: 2021-08-04 | End: 2021-12-21

## 2021-08-04 NOTE — TELEPHONE ENCOUNTER
Caller is going to school and moved to [place] recently. He requested a RX from the pharmacy. He rec'd a voice message from [facility] to call back. He says that on his current RX that he has 1 refill left with [facility] in [facility]. He is looking for a refill of Desvenlafaxine 50mg 24 hr tablets. He is not planning on using his insurance when he picks it up, so no prior authorization will be needed.   [facility] in [place], [place] pharmacy was contacted, but he was told there was something wrong with the prescription. He is out of medication today.   Phone number of pharmacy= [phone], they are closed now. He would like to pick it up tomorrow.   He only needs the 50mg tablet at this time.     Message will be routed to the refill pool.    Katheryn Garcia RN Triage Nurse Advisor 8:48 PM 8/3/2021    Reason for Disposition    [1] Caller requesting a NON-URGENT new prescription or refill AND [2] triager unable to refill per unit policy    Additional Information    Negative: Drug overdose and triager unable to answer question    Negative: Caller requesting information unrelated to medicine    Negative: Caller requesting a prescription for Strep throat and has a positive culture result    Negative: Rash while taking a medication or within 3 days of stopping it    Negative: Immunization reaction suspected    Negative: [1] Asthma and [2] having symptoms of asthma (cough, wheezing, etc.)    Negative: [1] Influenza symptoms AND [2] anti-viral med prescription request, such as Tamiflu    Negative: [1] Symptom of illness (e.g., headache, abdominal pain, earache, vomiting) AND [2] more than mild    Negative: MORE THAN A DOUBLE DOSE of a prescription or over-the-counter (OTC) drug    Negative: [1] DOUBLE DOSE (an extra dose or lesser amount) of over-the-counter (OTC) drug AND [2] any symptoms (e.g., dizziness, nausea, pain, sleepiness)    Negative: [1] DOUBLE DOSE (an extra dose or lesser amount) of prescription drug AND [2] any  "symptoms (e.g., dizziness, nausea, pain, sleepiness)    Negative: Took another person's prescription drug    Negative: [1] DOUBLE DOSE (an extra dose or lesser amount) of prescription drug AND [2] NO symptoms (Exception: a double dose of antibiotics)    Negative: Diabetes drug error or overdose (e.g., took wrong type of insulin or took extra dose)    Negative: [1] Request for URGENT new prescription or refill of \"essential\" medication (i.e., likelihood of harm to patient if not taken) AND [2] triager unable to fill per unit policy    Negative: [1] Prescription not at pharmacy AND [2] was prescribed by PCP recently    Negative: [1] Pharmacy calling with prescription questions AND [2] triager unable to answer question    Negative: [1] Caller has URGENT medication question about med that PCP or specialist prescribed AND [2] triager unable to answer question    Negative: [1] Caller has NON-URGENT medication question about med that PCP prescribed AND [2] triager unable to answer question    Protocols used: MEDICATION QUESTION CALL-A-AH      "

## 2021-08-04 NOTE — TELEPHONE ENCOUNTER
Has refill, see note, [facility]  Prescription approved per [facility] Refill Protocol.  Lizbeth Byrne RN, BSN  Message handled by CLINIC NURSE.

## 2021-08-10 NOTE — TELEPHONE ENCOUNTER
Left message for patient to call back. [phone].   Tobi, would you like us to attempt a PA?    [facility] [place] Patient recently picked up (8/3 or 8/4)  #30 out of pocket $82.23 so not out of pills. Pharmacy suggested we attempt PA.   Garcia Valencia, RN

## 2021-08-12 NOTE — TELEPHONE ENCOUNTER
Tobi left voicemail explaining he's moved to [place] for [employer] school. New insurance. Does not need or want a PA for desvenlafaxine.  Call back not needed.   Garcia Valencia RN

## 2021-09-18 ENCOUNTER — HEALTH MAINTENANCE LETTER (OUTPATIENT)
Age: 26
End: 2021-09-18

## 2021-12-21 ENCOUNTER — OFFICE VISIT (OUTPATIENT)
Dept: FAMILY MEDICINE | Facility: CLINIC | Age: 26
End: 2021-12-21
Payer: COMMERCIAL

## 2021-12-21 VITALS
RESPIRATION RATE: 18 BRPM | HEIGHT: 71 IN | WEIGHT: 199.9 LBS | DIASTOLIC BLOOD PRESSURE: 74 MMHG | TEMPERATURE: 98.4 F | HEART RATE: 78 BPM | BODY MASS INDEX: 27.98 KG/M2 | OXYGEN SATURATION: 97 % | SYSTOLIC BLOOD PRESSURE: 116 MMHG

## 2021-12-21 DIAGNOSIS — F41.9 ANXIETY: ICD-10-CM

## 2021-12-21 DIAGNOSIS — F33.0 MAJOR DEPRESSIVE DISORDER, RECURRENT EPISODE, MILD (H): ICD-10-CM

## 2021-12-21 DIAGNOSIS — J98.01 BRONCHOSPASM: Primary | ICD-10-CM

## 2021-12-21 PROCEDURE — 99214 OFFICE O/P EST MOD 30 MIN: CPT | Mod: 25 | Performed by: PHYSICIAN ASSISTANT

## 2021-12-21 PROCEDURE — 0004A COVID-19,PF,PFIZER (12+ YRS): CPT | Performed by: PHYSICIAN ASSISTANT

## 2021-12-21 PROCEDURE — 91300 COVID-19,PF,PFIZER (12+ YRS): CPT | Performed by: PHYSICIAN ASSISTANT

## 2021-12-21 PROCEDURE — 90686 IIV4 VACC NO PRSV 0.5 ML IM: CPT | Performed by: PHYSICIAN ASSISTANT

## 2021-12-21 PROCEDURE — 90471 IMMUNIZATION ADMIN: CPT | Performed by: PHYSICIAN ASSISTANT

## 2021-12-21 RX ORDER — DESVENLAFAXINE 50 MG/1
TABLET, FILM COATED, EXTENDED RELEASE ORAL
Qty: 90 TABLET | Refills: 1 | Status: SHIPPED | OUTPATIENT
Start: 2021-12-21 | End: 2021-12-21

## 2021-12-21 RX ORDER — DESVENLAFAXINE 50 MG/1
TABLET, FILM COATED, EXTENDED RELEASE ORAL
Qty: 90 TABLET | Refills: 3 | Status: SHIPPED | OUTPATIENT
Start: 2021-12-21 | End: 2022-12-23

## 2021-12-21 RX ORDER — ALBUTEROL SULFATE 90 UG/1
2 AEROSOL, METERED RESPIRATORY (INHALATION) EVERY 6 HOURS PRN
Qty: 18 G | Refills: 3 | Status: SHIPPED | OUTPATIENT
Start: 2021-12-21

## 2021-12-21 RX ORDER — DESVENLAFAXINE 25 MG/1
25 TABLET, EXTENDED RELEASE ORAL DAILY
Qty: 90 TABLET | Refills: 1 | Status: SHIPPED | OUTPATIENT
Start: 2021-12-21 | End: 2021-12-21

## 2021-12-21 RX ORDER — DESVENLAFAXINE 25 MG/1
25 TABLET, EXTENDED RELEASE ORAL DAILY
Qty: 90 TABLET | Refills: 3 | Status: SHIPPED | OUTPATIENT
Start: 2021-12-21 | End: 2022-12-23 | Stop reason: DRUGHIGH

## 2021-12-21 ASSESSMENT — ANXIETY QUESTIONNAIRES
6. BECOMING EASILY ANNOYED OR IRRITABLE: SEVERAL DAYS
GAD7 TOTAL SCORE: 1
5. BEING SO RESTLESS THAT IT IS HARD TO SIT STILL: NOT AT ALL
1. FEELING NERVOUS, ANXIOUS, OR ON EDGE: NOT AT ALL
2. NOT BEING ABLE TO STOP OR CONTROL WORRYING: NOT AT ALL
7. FEELING AFRAID AS IF SOMETHING AWFUL MIGHT HAPPEN: NOT AT ALL
IF YOU CHECKED OFF ANY PROBLEMS ON THIS QUESTIONNAIRE, HOW DIFFICULT HAVE THESE PROBLEMS MADE IT FOR YOU TO DO YOUR WORK, TAKE CARE OF THINGS AT HOME, OR GET ALONG WITH OTHER PEOPLE: NOT DIFFICULT AT ALL
3. WORRYING TOO MUCH ABOUT DIFFERENT THINGS: NOT AT ALL

## 2021-12-21 ASSESSMENT — MIFFLIN-ST. JEOR: SCORE: 1896.74

## 2021-12-21 ASSESSMENT — PATIENT HEALTH QUESTIONNAIRE - PHQ9
SUM OF ALL RESPONSES TO PHQ QUESTIONS 1-9: 3
5. POOR APPETITE OR OVEREATING: NOT AT ALL

## 2021-12-21 NOTE — PROGRESS NOTES
Jacob Britton is a 26 year old who presents for the following health issues:  Restarting medication for depression and anxiety   HPI   Patient comes in to initiate medication again for his depression and anxiety. He was previously taking Prestiq  for his depression at 75mg daily and stopped talking abruptly when he lost health insurance coverage and was not able to get prescription refilled. While taking the medication he did not report having any side effects and felt like he tolerated well and did make a difference in his mental health. Since being off medication he feels like he is doing okay but feels off and not the same as when he was on it. He also stopped taking the Buspar for his anxiety but does not feel need for additional medication.  He also has a history of asthma and rarely has a need to use his inhaler. He is interested in getting inhaler prescription filled so he can have one on hand while he is away for school.    Depression and Anxiety Follow-Up    How are you doing with your depression since your last visit? Pt would like to restart the medication, stopped around 3 months ago    How are you doing with your anxiety since your last visit?  Improved     Are you having other symptoms that might be associated with depression or anxiety? No    Have you had a significant life event? No          Do you have any concerns with your use of alcohol or other drugs? No     Social History                Tobacco Use     Smoking status: Former Smoker     Smokeless tobacco: Current User       Types: Chew     Tobacco comment: socially   Vaping Use     Vaping Use: Never used   Substance Use Topics     Alcohol use: Yes       Alcohol/week: 0.0 standard drinks       Comment: socially      Drug use: No      PHQ 10/14/2020 3/5/2021 5/18/2021   PHQ-9 Total Score 6 6 2   Q9: Thoughts of better off dead/self-harm past 2 weeks Not at all Not at all Not at all      INDIGO-7 SCORE 10/14/2020 3/5/2021 5/18/2021   Total  "Score 2 (minimal anxiety) 4 (minimal anxiety) 1 (minimal anxiety)   Total Score 2 4 1      Last PHQ-9 12/21/2021   1.  Little interest or pleasure in doing things 0   2.  Feeling down, depressed, or hopeless 1   3.  Trouble falling or staying asleep, or sleeping too much 1   4.  Feeling tired or having little energy 0   5.  Poor appetite or overeating 0   6.  Feeling bad about yourself 0   7.  Trouble concentrating 1   8.  Moving slowly or restless 0   Q9: Thoughts of better off dead/self-harm past 2 weeks 0   PHQ-9 Total Score 3   Difficulty at work, home, or with people Not difficult at all      INDIGO-7  12/21/2021   1. Feeling nervous, anxious, or on edge 0   2. Not being able to stop or control worrying 0   3. Worrying too much about different things 0   4. Trouble relaxing 0   5. Being so restless that it is hard to sit still 0   6. Becoming easily annoyed or irritable 1   7. Feeling afraid, as if something awful might happen 0   INDIGO-7 Total Score 1   If you checked any problems, how difficult have they made it for you to do your work, take care of things at home, or get along with other people? Not difficult at all         Suicide Assessment Five-step Evaluation and Treatment (SAFE-T)      How many servings of fruits and vegetables do you eat daily?  2-3    On average, how many sweetened beverages do you drink each day (Examples: soda, juice, sweet tea, etc.  Do NOT count diet or artificially sweetened beverages)?   0    How many days per week do you exercise enough to make your heart beat faster? 3 or less    How many minutes a day do you exercise enough to make your heart beat faster? 30 - 60    How many days per week do you miss taking your medication? 0       ROS: 10 point ROS neg other than the symptoms noted above in the HPI.     Objective   /74 (BP Location: Right arm, Patient Position: Chair, Cuff Size: Adult Large)   Pulse 78   Temp 98.4  F (36.9  C) (Oral)   Resp 18   Ht 1.8 m (5' 10.87\")   " Wt 90.7 kg (199 lb 14.4 oz)   SpO2 97%   BMI 27.99 kg/m    Body mass index is 27.99 kg/m .    Physical Exam  Constitutional:       Appearance: Normal appearance.   Cardiovascular:      Rate and Rhythm: Normal rate and regular rhythm.      Heart sounds: Normal heart sounds.   Pulmonary:      Effort: Pulmonary effort is normal.      Breath sounds: Normal breath sounds.   Neurological:      Mental Status: He is alert.   Psychiatric:         Mood and Affect: Mood normal.         Behavior: Behavior normal.         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 His PHQ-9 and INDIGO-7 scores are mild and we will restart his Prestiq medication. We will start at 25mg daily dose for a week due to abruptly stopping medication over two months and will titrate slow to original 75 mg dose. If patient feels no side effects and continues to tolerate medication well, recommend 6 month follow up for med check in.   Albuterol inhaler was also refilled and recommend to use as needed for shortness of breath or wheezing.  Recommend patient follow up w/ pcp for annual wellness physical next year around May.

## 2021-12-21 NOTE — PROGRESS NOTES
{PROVIDER CHARTING PREFERENCE:129279}        Jacob Britton is a 26 year old who presents for the following health issues {ACCOMPANIED BY STATEMENT (Optional):257933}     HPI      Depression and Anxiety Follow-Up    How are you doing with your depression since your last visit? Pt would like to restart the medication, stopped around 3 months ago    How are you doing with your anxiety since your last visit?  Improved     Are you having other symptoms that might be associated with depression or anxiety? No    Have you had a significant life event? No          Do you have any concerns with your use of alcohol or other drugs? No     Social History            Tobacco Use     Smoking status: Former Smoker     Smokeless tobacco: Current User       Types: Chew     Tobacco comment: socially   Vaping Use     Vaping Use: Never used   Substance Use Topics     Alcohol use: Yes       Alcohol/week: 0.0 standard drinks       Comment: socially      Drug use: No      PHQ 10/14/2020 3/5/2021 5/18/2021   PHQ-9 Total Score 6 6 2   Q9: Thoughts of better off dead/self-harm past 2 weeks Not at all Not at all Not at all      INDIGO-7 SCORE 10/14/2020 3/5/2021 5/18/2021   Total Score 2 (minimal anxiety) 4 (minimal anxiety) 1 (minimal anxiety)   Total Score 2 4 1      Last PHQ-9 12/21/2021   1.  Little interest or pleasure in doing things 0   2.  Feeling down, depressed, or hopeless 1   3.  Trouble falling or staying asleep, or sleeping too much 1   4.  Feeling tired or having little energy 0   5.  Poor appetite or overeating 0   6.  Feeling bad about yourself 0   7.  Trouble concentrating 1   8.  Moving slowly or restless 0   Q9: Thoughts of better off dead/self-harm past 2 weeks 0   PHQ-9 Total Score 3   Difficulty at work, home, or with people Not difficult at all      INDIGO-7  12/21/2021   1. Feeling nervous, anxious, or on edge 0   2. Not being able to stop or control worrying 0   3. Worrying too much about different things 0   4.  Trouble relaxing 0   5. Being so restless that it is hard to sit still 0   6. Becoming easily annoyed or irritable 1   7. Feeling afraid, as if something awful might happen 0   INDIGO-7 Total Score 1   If you checked any problems, how difficult have they made it for you to do your work, take care of things at home, or get along with other people? Not difficult at all         Suicide Assessment Five-step Evaluation and Treatment (SAFE-T)  {Provider  Link to Depression Care Package SmartSet :559259}    How many servings of fruits and vegetables do you eat daily?  2-3    On average, how many sweetened beverages do you drink each day (Examples: soda, juice, sweet tea, etc.  Do NOT count diet or artificially sweetened beverages)?   0    How many days per week do you exercise enough to make your heart beat faster? 3 or less    How many minutes a day do you exercise enough to make your heart beat faster? 30 - 60    How many days per week do you miss taking your medication? 0     {additonal problems for provider to add (Optional):741297}     Review of Systems   {ROS COMP (Optional):963789}           Objective       There were no vitals taken for this visit.  There is no height or weight on file to calculate BMI.  Physical Exam   {Exam List (Optional):222316}     {Diagnostic Test Results (Optional):034226}     {AMBULATORY ATTESTATION (Optional):456968}

## 2021-12-21 NOTE — PROGRESS NOTES
"  Assessment & Plan     Major depressive disorder, recurrent episode, mild (H)  Stable though feels he still needs medication. Will restart. Start with 25 mg daily for 1 week then may increase to original 75 mg dose. If stable, recheck phq 9 in 6 months and annual rechecks. Otherwise, recommending follow up if needed.   - desvenlafaxine succinate (PRISTIQ) 25 MG 24 hr tablet; Take 1 tablet (25 mg) by mouth daily In combination with 50 mg tabs for total of 75 mg daily.  - desvenlafaxine (PRISTIQ) 50 MG 24 hr tablet; TAKE 1 TABLET (50 MG) BY MOUTH DAILY in combination with 25 mg tabs for a total of 75 mg daily.  -Medication use and side effects discussed with the patient. Patient is in complete understanding and agreement with plan.     Anxiety  As above   - desvenlafaxine succinate (PRISTIQ) 25 MG 24 hr tablet; Take 1 tablet (25 mg) by mouth daily In combination with 50 mg tabs for total of 75 mg daily.  - desvenlafaxine (PRISTIQ) 50 MG 24 hr tablet; TAKE 1 TABLET (50 MG) BY MOUTH DAILY in combination with 25 mg tabs for a total of 75 mg daily.    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   - albuterol (PROAIR HFA/PROVENTIL HFA/VENTOLIN HFA) 108 (90 Base) MCG/ACT inhaler; Inhale 2 puffs into the lungs every 6 hours as needed for shortness of breath / dyspnea or wheezing         BMI:   Estimated body mass index is 27.99 kg/m  as calculated from the following:    Height as of this encounter: 1.8 m (5' 10.87\").    Weight as of this encounter: 90.7 kg (199 lb 14.4 oz).         Return in about 1 year (around 12/21/2022).    Curry Sampson PA-C  [facility]    Jacob Britton is a 26 year old who presents for the following health issues   HPI     Depression and Anxiety Follow-Up    How are you doing with your depression since your last visit? Pt would like to restart the medication, stopped around 3 months ago    How are you doing with " your anxiety since your last visit?  Improved     Are you having other symptoms that might be associated with depression or anxiety? No    Have you had a significant life event? No     Do you have any concerns with your use of alcohol or other drugs? No    Social History     Tobacco Use     Smoking status: Former Smoker     Smokeless tobacco: Current User     Types: Chew     Tobacco comment: socially   Vaping Use     Vaping Use: Never used   Substance Use Topics     Alcohol use: Yes     Alcohol/week: 0.0 standard drinks     Comment: socially      Drug use: No     PHQ 3/5/2021 5/18/2021 12/21/2021   PHQ-9 Total Score 6 2 3   Q9: Thoughts of better off dead/self-harm past 2 weeks Not at all Not at all Not at all     INDIGO-7 SCORE 3/5/2021 5/18/2021 12/21/2021   Total Score 4 (minimal anxiety) 1 (minimal anxiety) -   Total Score 4 1 1     Last PHQ-9 12/21/2021   1.  Little interest or pleasure in doing things 0   2.  Feeling down, depressed, or hopeless 1   3.  Trouble falling or staying asleep, or sleeping too much 1   4.  Feeling tired or having little energy 0   5.  Poor appetite or overeating 0   6.  Feeling bad about yourself 0   7.  Trouble concentrating 1   8.  Moving slowly or restless 0   Q9: Thoughts of better off dead/self-harm past 2 weeks 0   PHQ-9 Total Score 3   Difficulty at work, home, or with people Not difficult at all     INDIGO-7  12/21/2021   1. Feeling nervous, anxious, or on edge 0   2. Not being able to stop or control worrying 0   3. Worrying too much about different things 0   4. Trouble relaxing 0   5. Being so restless that it is hard to sit still 0   6. Becoming easily annoyed or irritable 1   7. Feeling afraid, as if something awful might happen 0   INDIGO-7 Total Score 1   If you checked any problems, how difficult have they made it for you to do your work, take care of things at home, or get along with other people? Not difficult at all       Suicide Assessment Five-step Evaluation and  "Treatment (SAFE-T)      How many servings of fruits and vegetables do you eat daily?  2-3    On average, how many sweetened beverages do you drink each day (Examples: soda, juice, sweet tea, etc.  Do NOT count diet or artificially sweetened beverages)?   0    How many days per week do you exercise enough to make your heart beat faster? 3 or less    How many minutes a day do you exercise enough to make your heart beat faster? 30 - 60    How many days per week do you miss taking your medication? 0        Review of Systems         Objective    /74 (BP Location: Right arm, Patient Position: Chair, Cuff Size: Adult Large)   Pulse 78   Temp 98.4  F (36.9  C) (Oral)   Resp 18   Ht 1.8 m (5' 10.87\")   Wt 90.7 kg (199 lb 14.4 oz)   SpO2 97%   BMI 27.99 kg/m    Body mass index is 27.99 kg/m .  Physical Exam                   "

## 2021-12-22 ENCOUNTER — TELEPHONE (OUTPATIENT)
Dept: FAMILY MEDICINE | Facility: CLINIC | Age: 26
End: 2021-12-22
Payer: COMMERCIAL

## 2021-12-22 ASSESSMENT — ANXIETY QUESTIONNAIRES: GAD7 TOTAL SCORE: 1

## 2021-12-22 NOTE — TELEPHONE ENCOUNTER
Please start PA. Has tried and failed multiple medications in the past I believe. However, I began seeing patient through our system while he was already established on pristiq. Please first inquire with patient on which he has tried and failed in the past and then send this to [facility] melvin santos, pac

## 2021-12-22 NOTE — TELEPHONE ENCOUNTER
Pharmacy Benefit Information:    Provider: ANGELICA  Phone #: [phone]  ID#: [id number]  PA also started via CMM: KEY: [id number]  Medication/SIG: Desvenlafaxine  Pharmacy: [facility]    Routed to ANGELICA, please advise RX CHANGE OR PA, route to [facility] PA pool or inform pharmacy of plan    Lizbeth Byrne RN, BSN  [facility]

## 2021-12-22 NOTE — TELEPHONE ENCOUNTER
Duplicate request, the medication currently is being processed through [facility] EPA:

## 2022-02-17 PROBLEM — K21.9 GASTROESOPHAGEAL REFLUX DISEASE: Status: ACTIVE | Noted: 2018-09-14

## 2022-06-22 ENCOUNTER — TELEPHONE (OUTPATIENT)
Dept: FAMILY MEDICINE | Facility: CLINIC | Age: 27
End: 2022-06-22

## 2022-06-22 NOTE — TELEPHONE ENCOUNTER
Curry Sampson PA-C  P Cr Sb2/3/4 Gold Team (Ma-Tc)  Please update phq 9 if not updated in last 6 months.     -piotr sampson, pac     LVM for pt to call clinic.  Sent [employer] message.    NEMO Centeno

## 2022-06-25 ENCOUNTER — HEALTH MAINTENANCE LETTER (OUTPATIENT)
Age: 27
End: 2022-06-25

## 2022-11-19 ENCOUNTER — HEALTH MAINTENANCE LETTER (OUTPATIENT)
Age: 27
End: 2022-11-19

## 2022-12-23 ENCOUNTER — VIRTUAL VISIT (OUTPATIENT)
Dept: FAMILY MEDICINE | Facility: CLINIC | Age: 27
End: 2022-12-23
Payer: COMMERCIAL

## 2022-12-23 DIAGNOSIS — F41.9 ANXIETY: ICD-10-CM

## 2022-12-23 DIAGNOSIS — F33.0 MAJOR DEPRESSIVE DISORDER, RECURRENT EPISODE, MILD (H): ICD-10-CM

## 2022-12-23 PROCEDURE — 96127 BRIEF EMOTIONAL/BEHAV ASSMT: CPT | Mod: 95 | Performed by: FAMILY MEDICINE

## 2022-12-23 PROCEDURE — 99214 OFFICE O/P EST MOD 30 MIN: CPT | Mod: 95 | Performed by: FAMILY MEDICINE

## 2022-12-23 RX ORDER — DESVENLAFAXINE 50 MG/1
TABLET, FILM COATED, EXTENDED RELEASE ORAL
Qty: 90 TABLET | Refills: 1 | Status: SHIPPED | OUTPATIENT
Start: 2022-12-23 | End: 2023-06-26

## 2022-12-23 ASSESSMENT — ANXIETY QUESTIONNAIRES
GAD7 TOTAL SCORE: 1
8. IF YOU CHECKED OFF ANY PROBLEMS, HOW DIFFICULT HAVE THESE MADE IT FOR YOU TO DO YOUR WORK, TAKE CARE OF THINGS AT HOME, OR GET ALONG WITH OTHER PEOPLE?: NOT DIFFICULT AT ALL
6. BECOMING EASILY ANNOYED OR IRRITABLE: SEVERAL DAYS
GAD7 TOTAL SCORE: 1
3. WORRYING TOO MUCH ABOUT DIFFERENT THINGS: NOT AT ALL
GAD7 TOTAL SCORE: 1
2. NOT BEING ABLE TO STOP OR CONTROL WORRYING: NOT AT ALL
1. FEELING NERVOUS, ANXIOUS, OR ON EDGE: NOT AT ALL
7. FEELING AFRAID AS IF SOMETHING AWFUL MIGHT HAPPEN: NOT AT ALL
5. BEING SO RESTLESS THAT IT IS HARD TO SIT STILL: NOT AT ALL
4. TROUBLE RELAXING: NOT AT ALL
7. FEELING AFRAID AS IF SOMETHING AWFUL MIGHT HAPPEN: NOT AT ALL
IF YOU CHECKED OFF ANY PROBLEMS ON THIS QUESTIONNAIRE, HOW DIFFICULT HAVE THESE PROBLEMS MADE IT FOR YOU TO DO YOUR WORK, TAKE CARE OF THINGS AT HOME, OR GET ALONG WITH OTHER PEOPLE: NOT DIFFICULT AT ALL

## 2022-12-23 ASSESSMENT — PATIENT HEALTH QUESTIONNAIRE - PHQ9
10. IF YOU CHECKED OFF ANY PROBLEMS, HOW DIFFICULT HAVE THESE PROBLEMS MADE IT FOR YOU TO DO YOUR WORK, TAKE CARE OF THINGS AT HOME, OR GET ALONG WITH OTHER PEOPLE: NOT DIFFICULT AT ALL
SUM OF ALL RESPONSES TO PHQ QUESTIONS 1-9: 2
SUM OF ALL RESPONSES TO PHQ QUESTIONS 1-9: 2

## 2022-12-23 NOTE — PROGRESS NOTES
Tobi is a 27 year old who is being evaluated via a billable telephone visit.      What phone number would you like to be contacted at? [phone]  How would you like to obtain your AVS? Jorge  Distant Location (provider location):  Off-site    Assessment & Plan     Major depressive disorder, recurrent episode, mild (H)  Restart at 50 mg daily.  Discussed with patient the potential for some side effects restarting and going directly to 50 mg.  If he has problems, he is to let us know.  We can send in a few days worth of the 25 mg tablets to take before increasing back to the 50 mg.    - desvenlafaxine (PRISTIQ) 50 MG 24 hr tablet; TAKE 1 TABLET (50 MG) BY MOUTH DAILY    Anxiety  As above  - desvenlafaxine (PRISTIQ) 50 MG 24 hr tablet; TAKE 1 TABLET (50 MG) BY MOUTH DAILY    10 minutes spent on the date of the encounter doing chart review, history and exam, documentation and further activities per the note       See Patient Instructions    No follow-ups on file.   Follow-up Visit   Expected date:  Jun 23, 2023 (Approximate)      Follow Up Appointment Details:     Follow-up with whom?: PCP    Follow-Up for what?: Chronic Disease f/u    Chronic Disease f/u:  Anxiety  Depression       How?: In Person                    Malou Garner MD  [facility]   Tobi is a 27 year old, presenting for the following health issues:  No chief complaint on file.      History of Present Illness       Mental Health Follow-up:  Patient presents to follow-up on Depression.Patient's depression since last visit has been:  Good  The patient is not having other symptoms associated with depression.      Any significant life events: No  Patient is not feeling anxious or having panic attacks.  Patient has no concerns about alcohol or drug use.    He eats 2-3 servings of fruits and vegetables daily.He consumes 0 sweetened beverage(s) daily.He exercises with enough effort to increase his heart  rate 30 to 60 minutes per day.  He exercises with enough effort to increase his heart rate 5 days per week.   He is taking medications regularly.    Today's PHQ-9         PHQ-9 Total Score: 2    PHQ-9 Q9 Thoughts of better off dead/self-harm past 2 weeks :   Not at all    How difficult have these problems made it for you to do your work, take care of things at home, or get along with other people: Not difficult at all  Today's INDIGO-7 Score: 1       Depression Followup    How are you doing with your depression since your last visit? Improved Well managed at this point.     Are you having other symptoms that might be associated with depression? Yes:  Aryan ran out of the medicaiton three days ago and is experencing some withdrawl symptoms.     Have you had a significant life event?  OTHER: Patient is getting  July      Are you feeling anxious or having panic attacks?   No    Do you have any concerns with your use of alcohol or other drugs? No    Takes Desvenlafaxine, has been taking for several years, no problems with the medication, no side effects that he is aware of.  Has been out of this for about 3 days, feels like his brain is getting zapped by electric shocks, having some withdrawal symptoms from not having the medication.  Otherwise he has no concerns and would like to continue the medication.  Currently taking 50 mg, no the 75 mg noted in chart.    Social History     Tobacco Use     Smoking status: Former     Passive exposure: Never     Smokeless tobacco: Current     Types: Chew     Tobacco comments:     Socially-cut down quite a bit and an weaning off. Goes through one tin about 1 1/2 weeks    Vaping Use     Vaping Use: Former   Substance Use Topics     Alcohol use: Yes     Alcohol/week: 0.0 standard drinks     Comment: socially      Drug use: No     PHQ 12/21/2021 7/26/2022 12/23/2022   PHQ-9 Total Score 3 1 2   Q9: Thoughts of better off dead/self-harm past 2 weeks Not at all Not at all Not at all  "    INDIGO-7 SCORE 5/18/2021 12/21/2021 12/23/2022   Total Score 1 (minimal anxiety) - 1 (minimal anxiety)   Total Score 1 1 1         Suicide Assessment Five-step Evaluation and Treatment (SAFE-T)      How many servings of fruits and vegetables do you eat daily?  2-3    On average, how many sweetened beverages do you drink each day (Examples: soda, juice, sweet tea, etc.  Do NOT count diet or artificially sweetened beverages)?   0    How many days per week do you exercise enough to make your heart beat faster? 4    How many minutes a day do you exercise enough to make your heart beat faster? 30 - 60    How many days per week do you miss taking your medication? 0        Review of Systems   Constitutional, HEENT, cardiovascular, pulmonary, gi and gu systems are negative, except as otherwise noted.      Objective    Vitals - Patient Reported  Weight (Patient Reported): 86.2 kg (190 lb)  Height (Patient Reported): 179.1 cm (5' 10.5\")  BMI (Based on Pt Reported Ht/Wt): 26.88  Pulse (Patient Reported): 62  Pain Score: No Pain (0)      Vitals:  No vitals were obtained today due to virtual visit.    Physical Exam   healthy, alert and no distress  PSYCH: Alert and oriented times 3; coherent speech, normal   rate and volume, able to articulate logical thoughts, able   to abstract reason, no tangential thoughts, no hallucinations   or delusions  His affect is normal and pleasant  RESP: No cough, no audible wheezing, able to talk in full sentences  Remainder of exam unable to be completed due to telephone visits            Phone call duration: 4 minutes    "

## 2023-06-26 ENCOUNTER — VIRTUAL VISIT (OUTPATIENT)
Dept: FAMILY MEDICINE | Facility: OTHER | Age: 28
End: 2023-06-26
Payer: COMMERCIAL

## 2023-06-26 DIAGNOSIS — F41.9 ANXIETY: ICD-10-CM

## 2023-06-26 DIAGNOSIS — R40.0 DAYTIME SLEEPINESS: ICD-10-CM

## 2023-06-26 DIAGNOSIS — R06.83 SNORING: ICD-10-CM

## 2023-06-26 DIAGNOSIS — F33.0 MAJOR DEPRESSIVE DISORDER, RECURRENT EPISODE, MILD (H): Primary | ICD-10-CM

## 2023-06-26 PROBLEM — K21.9 GASTROESOPHAGEAL REFLUX DISEASE: Status: RESOLVED | Noted: 2018-09-14 | Resolved: 2023-06-26

## 2023-06-26 PROCEDURE — 99213 OFFICE O/P EST LOW 20 MIN: CPT | Mod: 93 | Performed by: PHYSICIAN ASSISTANT

## 2023-06-26 PROCEDURE — 96127 BRIEF EMOTIONAL/BEHAV ASSMT: CPT | Performed by: PHYSICIAN ASSISTANT

## 2023-06-26 RX ORDER — DESVENLAFAXINE 50 MG/1
TABLET, FILM COATED, EXTENDED RELEASE ORAL
Qty: 90 TABLET | Refills: 0 | Status: SHIPPED | OUTPATIENT
Start: 2023-06-26

## 2023-06-26 ASSESSMENT — PATIENT HEALTH QUESTIONNAIRE - PHQ9
SUM OF ALL RESPONSES TO PHQ QUESTIONS 1-9: 3
10. IF YOU CHECKED OFF ANY PROBLEMS, HOW DIFFICULT HAVE THESE PROBLEMS MADE IT FOR YOU TO DO YOUR WORK, TAKE CARE OF THINGS AT HOME, OR GET ALONG WITH OTHER PEOPLE: NOT DIFFICULT AT ALL
SUM OF ALL RESPONSES TO PHQ QUESTIONS 1-9: 3

## 2023-06-26 NOTE — PROGRESS NOTES
Tobi is a 27 year old who is being evaluated via a billable telephone visit.      What phone number would you like to be contacted at? [phone]   How would you like to obtain your AVS? Jorge    Distant Location (provider location):  Off-site    Assessment & Plan     Major depressive disorder, recurrent episode, mild (H)  Anxiety  Patient is overdue for face to face visit, last in office was in 2021. He was started on pristiq some years ago, estimates ~9-10yrs. Over this time he feels that the medication has been very helpful in controlling his symptoms compared to other ssri/snri medication he has tried. Denies adverse effects or missing doses. Denies thoughts of SI/HI.   - desvenlafaxine (PRISTIQ) 50 MG 24 hr tablet; TAKE 1 TABLET (50 MG) BY MOUTH DAILY    Daytime sleepiness  Snoring  Patient informs me that his mother was recently diagnosed with CHOCO and that his believes his father, now passed, had been diagnosed with CHOCO as well. Patient does have history of snoring and informs me that he has been waking up feeling unrefreshed. He denies having difficulty falling or staying asleep. He is currently living with his in-laws in [place], [place] for a work related commitment. Estimates that this will be his home for the next year. Education on CHOCO provided as well as common symptoms. We did discuss sleep study to rule out CHOCO for himself. The main health care system in the [place] area is [facility]. He will check to see if they offer sleep study through the clinic in [facility] or if they are covered in his network. If not, he will reach out to me or PCP to request referral to [facility].     Patient was agreeable to scheduling face to face visit in the next 3 months. I did try to find PCP's schedule, but was not confident that I had the correct individual. Patient will schedule through [employer] or by calling his clinic.     BRENDA Escalante [facility]    Tobi is a 27 year old, presenting for the following health issues:  Recheck Medication and MH Follow Up        6/26/2023     3:27 PM   Additional Questions   Roomed by Orlin ZAMBRANO   Accompanied by Self         6/26/2023     3:27 PM   Patient Reported Additional Medications   Patient reports taking the following new medications None     History of Present Illness       Mental Health Follow-up:  Patient presents to follow-up on Depression.Patient's depression since last visit has been:  Good  The patient is not having other symptoms associated with depression.      Any significant life events: No  Patient is not feeling anxious or having panic attacks.  Patient has no concerns about alcohol or drug use.    He eats 2-3 servings of fruits and vegetables daily.He consumes 1 sweetened beverage(s) daily.He exercises with enough effort to increase his heart rate 60 or more minutes per day.  He exercises with enough effort to increase his heart rate 5 days per week.   He is taking medications regularly.    Today's PHQ-9         PHQ-9 Total Score: 3    PHQ-9 Q9 Thoughts of better off dead/self-harm past 2 weeks :   Not at all    How difficult have these problems made it for you to do your work, take care of things at home, or get along with other people: Not difficult at all     Review of Systems   Constitutional, HEENT, cardiovascular, pulmonary, gi and gu systems are negative, except as otherwise noted.      Objective           Vitals:  No vitals were obtained today due to virtual visit.    Physical Exam   healthy, alert and no distress  PSYCH: Alert and oriented times 3; coherent speech, normal   rate and volume, able to articulate logical thoughts, able   to abstract reason, no tangential thoughts, no hallucinations   or delusions  His affect is normal  RESP: No cough, no audible wheezing, able to talk in full sentences  Remainder of exam unable to be completed due to telephone visits            Phone call duration: 21  minutes

## 2023-07-02 ENCOUNTER — HEALTH MAINTENANCE LETTER (OUTPATIENT)
Age: 28
End: 2023-07-02

## 2023-09-22 DIAGNOSIS — F33.0 MAJOR DEPRESSIVE DISORDER, RECURRENT EPISODE, MILD (H): ICD-10-CM

## 2023-09-22 DIAGNOSIS — F41.9 ANXIETY: ICD-10-CM

## 2023-09-22 RX ORDER — DESVENLAFAXINE 50 MG/1
TABLET, FILM COATED, EXTENDED RELEASE ORAL
Qty: 90 TABLET | Refills: 0 | OUTPATIENT
Start: 2023-09-22

## 2023-09-22 NOTE — TELEPHONE ENCOUNTER
Patient was instructed to schedule a face to face visit at the time of his virtual in June 2023. At this time I cannot provide further refills of the medication. I recommend that he reach out to his local clinic to ask for same day or schedule the face to face and see if the provider he has scheduled with will fill the medication to get him to that date.    Justo Bui PA-C on 9/22/2023 at 11:13 AM

## 2023-09-25 NOTE — TELEPHONE ENCOUNTER
Attempted to reach Tobi today at Mobile: [phone] . No answer. TRC to schedule face to face visit for medication follow up.

## 2024-06-03 ENCOUNTER — MEDICAL CORRESPONDENCE (OUTPATIENT)
Dept: HEALTH INFORMATION MANAGEMENT | Facility: CLINIC | Age: 29
End: 2024-06-03

## 2024-06-04 ENCOUNTER — TRANSCRIBE ORDERS (OUTPATIENT)
Dept: OTHER | Age: 29
End: 2024-06-04

## 2024-06-04 DIAGNOSIS — F33.0 MAJOR DEPRESSIVE DISORDER, RECURRENT EPISODE, MILD (H): Primary | ICD-10-CM

## 2024-10-29 ENCOUNTER — MYC MEDICAL ADVICE (OUTPATIENT)
Dept: FAMILY MEDICINE | Facility: CLINIC | Age: 29
End: 2024-10-29
Payer: COMMERCIAL

## 2024-10-30 ENCOUNTER — E-VISIT (OUTPATIENT)
Dept: FAMILY MEDICINE | Facility: CLINIC | Age: 29
End: 2024-10-30
Payer: COMMERCIAL

## 2024-10-30 DIAGNOSIS — Z11.1 SCREENING EXAMINATION FOR PULMONARY TUBERCULOSIS: Primary | ICD-10-CM

## 2024-10-30 PROCEDURE — 99421 OL DIG E/M SVC 5-10 MIN: CPT | Performed by: PHYSICIAN ASSISTANT

## 2024-10-30 NOTE — PATIENT INSTRUCTIONS
Thank you for choosing us for your care. I have placed the below lab(s) for you:  Orders Placed This Encounter   Procedures     Quantiferon TB Gold Plus        To schedule your lab appointment, please click the Schedule button in your [employer] Home Page.

## 2024-10-31 ENCOUNTER — LAB (OUTPATIENT)
Dept: LAB | Facility: CLINIC | Age: 29
End: 2024-10-31
Attending: PHYSICIAN ASSISTANT
Payer: COMMERCIAL

## 2024-10-31 DIAGNOSIS — Z11.1 SCREENING EXAMINATION FOR PULMONARY TUBERCULOSIS: ICD-10-CM

## 2024-10-31 PROCEDURE — 36415 COLL VENOUS BLD VENIPUNCTURE: CPT

## 2024-10-31 PROCEDURE — 86481 TB AG RESPONSE T-CELL SUSP: CPT

## 2024-11-01 LAB
QUANTIFERON MITOGEN: 10 IU/ML
QUANTIFERON NIL TUBE: 0.12 IU/ML
QUANTIFERON TB1 TUBE: 0.15 IU/ML
QUANTIFERON TB2 TUBE: 0.17

## 2024-11-02 LAB
GAMMA INTERFERON BACKGROUND BLD IA-ACNC: 0.12 IU/ML
M TB IFN-G BLD-IMP: NEGATIVE
M TB IFN-G CD4+ BCKGRND COR BLD-ACNC: 9.88 IU/ML
MITOGEN IGNF BCKGRD COR BLD-ACNC: 0.03 IU/ML
MITOGEN IGNF BCKGRD COR BLD-ACNC: 0.05 IU/ML

## 2025-05-08 ENCOUNTER — PATIENT OUTREACH (OUTPATIENT)
Dept: CARE COORDINATION | Facility: CLINIC | Age: 30
End: 2025-05-08
Payer: COMMERCIAL

## 2025-07-12 ENCOUNTER — HEALTH MAINTENANCE LETTER (OUTPATIENT)
Age: 30
End: 2025-07-12